# Patient Record
Sex: FEMALE | Race: BLACK OR AFRICAN AMERICAN | NOT HISPANIC OR LATINO
[De-identification: names, ages, dates, MRNs, and addresses within clinical notes are randomized per-mention and may not be internally consistent; named-entity substitution may affect disease eponyms.]

---

## 2017-02-21 ENCOUNTER — FORM ENCOUNTER (OUTPATIENT)
Age: 52
End: 2017-02-21

## 2017-02-24 ENCOUNTER — RESULT REVIEW (OUTPATIENT)
Age: 52
End: 2017-02-24

## 2017-03-02 ENCOUNTER — APPOINTMENT (OUTPATIENT)
Dept: PLASTIC SURGERY | Facility: CLINIC | Age: 52
End: 2017-03-02

## 2017-03-02 DIAGNOSIS — K43.9 VENTRAL HERNIA W/OUT OBSTRUCTION OR GANGRENE: ICD-10-CM

## 2017-03-10 ENCOUNTER — OUTPATIENT (OUTPATIENT)
Dept: OUTPATIENT SERVICES | Facility: HOSPITAL | Age: 52
LOS: 1 days | End: 2017-03-10

## 2017-03-10 DIAGNOSIS — Z90.721 ACQUIRED ABSENCE OF OVARIES, UNILATERAL: Chronic | ICD-10-CM

## 2017-03-10 DIAGNOSIS — Z41.9 ENCOUNTER FOR PROCEDURE FOR PURPOSES OTHER THAN REMEDYING HEALTH STATE, UNSPECIFIED: Chronic | ICD-10-CM

## 2017-03-10 RX ORDER — OXYCODONE AND ACETAMINOPHEN 5; 325 MG/1; MG/1
5-325 TABLET ORAL
Qty: 30 | Refills: 0 | Status: COMPLETED | COMMUNITY
Start: 2017-03-10 | End: 2017-03-31

## 2017-03-13 ENCOUNTER — APPOINTMENT (OUTPATIENT)
Dept: PLASTIC SURGERY | Facility: CLINIC | Age: 52
End: 2017-03-13

## 2017-05-12 ENCOUNTER — OUTPATIENT (OUTPATIENT)
Dept: OUTPATIENT SERVICES | Facility: HOSPITAL | Age: 52
LOS: 1 days | Discharge: HOME | End: 2017-05-12
Payer: COMMERCIAL

## 2017-05-12 ENCOUNTER — MESSAGE (OUTPATIENT)
Age: 52
End: 2017-05-12

## 2017-05-12 ENCOUNTER — RESULT REVIEW (OUTPATIENT)
Age: 52
End: 2017-05-12

## 2017-05-12 DIAGNOSIS — Z88.8 ALLERGY STATUS TO OTHER DRUGS, MEDICAMENTS AND BIOLOGICAL SUBSTANCES STATUS: ICD-10-CM

## 2017-05-12 DIAGNOSIS — N64.89 OTHER SPECIFIED DISORDERS OF BREAST: ICD-10-CM

## 2017-05-12 DIAGNOSIS — Z41.9 ENCOUNTER FOR PROCEDURE FOR PURPOSES OTHER THAN REMEDYING HEALTH STATE, UNSPECIFIED: Chronic | ICD-10-CM

## 2017-05-12 DIAGNOSIS — N65.0 DEFORMITY OF RECONSTRUCTED BREAST: ICD-10-CM

## 2017-05-12 DIAGNOSIS — R11.0 NAUSEA: ICD-10-CM

## 2017-05-12 DIAGNOSIS — Z90.721 ACQUIRED ABSENCE OF OVARIES, UNILATERAL: Chronic | ICD-10-CM

## 2017-05-12 DIAGNOSIS — N60.31 FIBROSCLEROSIS OF RIGHT BREAST: ICD-10-CM

## 2017-05-12 DIAGNOSIS — Z85.3 PERSONAL HISTORY OF MALIGNANT NEOPLASM OF BREAST: ICD-10-CM

## 2017-05-12 PROCEDURE — 19350 NIPPLE/AREOLA RECONSTRUCTION: CPT | Mod: 59,RT

## 2017-05-12 PROCEDURE — 19380 REVJ RECONSTRUCTED BREAST: CPT | Mod: RT

## 2017-05-16 ENCOUNTER — APPOINTMENT (OUTPATIENT)
Dept: PLASTIC SURGERY | Facility: CLINIC | Age: 52
End: 2017-05-16

## 2017-05-22 LAB — SURGICAL PATHOLOGY STUDY: SIGNIFICANT CHANGE UP

## 2017-05-30 ENCOUNTER — APPOINTMENT (OUTPATIENT)
Dept: PLASTIC SURGERY | Facility: CLINIC | Age: 52
End: 2017-05-30

## 2017-08-22 ENCOUNTER — APPOINTMENT (OUTPATIENT)
Dept: BREAST CENTER | Facility: CLINIC | Age: 52
End: 2017-08-22
Payer: COMMERCIAL

## 2017-08-22 VITALS
TEMPERATURE: 96.7 F | WEIGHT: 125 LBS | HEART RATE: 86 BPM | SYSTOLIC BLOOD PRESSURE: 117 MMHG | RESPIRATION RATE: 16 BRPM | BODY MASS INDEX: 23.6 KG/M2 | DIASTOLIC BLOOD PRESSURE: 79 MMHG | HEIGHT: 61 IN

## 2017-08-22 PROCEDURE — 99213 OFFICE O/P EST LOW 20 MIN: CPT

## 2017-09-12 ENCOUNTER — APPOINTMENT (OUTPATIENT)
Dept: BREAST CENTER | Facility: CLINIC | Age: 52
End: 2017-09-12

## 2017-11-16 ENCOUNTER — APPOINTMENT (OUTPATIENT)
Dept: PLASTIC SURGERY | Facility: CLINIC | Age: 52
End: 2017-11-16
Payer: COMMERCIAL

## 2017-11-16 DIAGNOSIS — N65.0 DEFORMITY OF RECONSTRUCTED BREAST: ICD-10-CM

## 2017-11-16 PROCEDURE — 99213 OFFICE O/P EST LOW 20 MIN: CPT

## 2017-12-06 ENCOUNTER — APPOINTMENT (OUTPATIENT)
Dept: BREAST CENTER | Facility: CLINIC | Age: 52
End: 2017-12-06
Payer: COMMERCIAL

## 2017-12-06 VITALS
RESPIRATION RATE: 16 BRPM | BODY MASS INDEX: 23.6 KG/M2 | DIASTOLIC BLOOD PRESSURE: 82 MMHG | WEIGHT: 125 LBS | HEART RATE: 92 BPM | SYSTOLIC BLOOD PRESSURE: 123 MMHG | HEIGHT: 61 IN

## 2017-12-06 PROCEDURE — 99213 OFFICE O/P EST LOW 20 MIN: CPT

## 2018-01-10 ENCOUNTER — FORM ENCOUNTER (OUTPATIENT)
Age: 53
End: 2018-01-10

## 2018-01-11 ENCOUNTER — OUTPATIENT (OUTPATIENT)
Dept: OUTPATIENT SERVICES | Facility: HOSPITAL | Age: 53
LOS: 1 days | End: 2018-01-11
Payer: COMMERCIAL

## 2018-01-11 DIAGNOSIS — Z90.721 ACQUIRED ABSENCE OF OVARIES, UNILATERAL: Chronic | ICD-10-CM

## 2018-01-11 DIAGNOSIS — Z41.9 ENCOUNTER FOR PROCEDURE FOR PURPOSES OTHER THAN REMEDYING HEALTH STATE, UNSPECIFIED: Chronic | ICD-10-CM

## 2018-01-11 PROCEDURE — 76641 ULTRASOUND BREAST COMPLETE: CPT | Mod: 26,LT

## 2018-01-11 PROCEDURE — 77067 SCR MAMMO BI INCL CAD: CPT

## 2018-01-11 PROCEDURE — 77067 SCR MAMMO BI INCL CAD: CPT | Mod: 26,52

## 2018-01-11 PROCEDURE — 76641 ULTRASOUND BREAST COMPLETE: CPT

## 2018-02-14 ENCOUNTER — FORM ENCOUNTER (OUTPATIENT)
Age: 53
End: 2018-02-14

## 2018-02-15 ENCOUNTER — OUTPATIENT (OUTPATIENT)
Dept: OUTPATIENT SERVICES | Facility: HOSPITAL | Age: 53
LOS: 1 days | End: 2018-02-15
Payer: COMMERCIAL

## 2018-02-15 ENCOUNTER — APPOINTMENT (OUTPATIENT)
Dept: MRI IMAGING | Facility: HOSPITAL | Age: 53
End: 2018-02-15

## 2018-02-15 DIAGNOSIS — Z41.9 ENCOUNTER FOR PROCEDURE FOR PURPOSES OTHER THAN REMEDYING HEALTH STATE, UNSPECIFIED: Chronic | ICD-10-CM

## 2018-02-15 DIAGNOSIS — Z90.721 ACQUIRED ABSENCE OF OVARIES, UNILATERAL: Chronic | ICD-10-CM

## 2018-02-15 PROCEDURE — C8906: CPT

## 2018-02-15 PROCEDURE — 77059 MRI BREAST BILATERAL: CPT | Mod: 26

## 2018-02-15 PROCEDURE — 0159T: CPT | Mod: 26

## 2018-02-15 PROCEDURE — A9585: CPT

## 2018-02-15 PROCEDURE — C8937: CPT

## 2018-02-26 ENCOUNTER — FORM ENCOUNTER (OUTPATIENT)
Age: 53
End: 2018-02-26

## 2018-02-27 ENCOUNTER — OUTPATIENT (OUTPATIENT)
Dept: OUTPATIENT SERVICES | Facility: HOSPITAL | Age: 53
LOS: 1 days | End: 2018-02-27
Payer: COMMERCIAL

## 2018-02-27 ENCOUNTER — APPOINTMENT (OUTPATIENT)
Dept: BREAST CENTER | Facility: CLINIC | Age: 53
End: 2018-02-27
Payer: COMMERCIAL

## 2018-02-27 ENCOUNTER — APPOINTMENT (OUTPATIENT)
Dept: ULTRASOUND IMAGING | Facility: HOSPITAL | Age: 53
End: 2018-02-27

## 2018-02-27 VITALS — DIASTOLIC BLOOD PRESSURE: 95 MMHG | SYSTOLIC BLOOD PRESSURE: 137 MMHG | HEART RATE: 98 BPM

## 2018-02-27 DIAGNOSIS — R92.8 OTHER ABNORMAL AND INCONCLUSIVE FINDINGS ON DIAGNOSTIC IMAGING OF BREAST: ICD-10-CM

## 2018-02-27 DIAGNOSIS — Z41.9 ENCOUNTER FOR PROCEDURE FOR PURPOSES OTHER THAN REMEDYING HEALTH STATE, UNSPECIFIED: Chronic | ICD-10-CM

## 2018-02-27 DIAGNOSIS — Z90.721 ACQUIRED ABSENCE OF OVARIES, UNILATERAL: Chronic | ICD-10-CM

## 2018-02-27 PROCEDURE — 76641 ULTRASOUND BREAST COMPLETE: CPT

## 2018-02-27 PROCEDURE — 99213 OFFICE O/P EST LOW 20 MIN: CPT

## 2018-02-27 PROCEDURE — 76642 ULTRASOUND BREAST LIMITED: CPT

## 2018-02-27 PROCEDURE — 76642 ULTRASOUND BREAST LIMITED: CPT | Mod: 26,RT

## 2018-02-27 PROCEDURE — 76641 ULTRASOUND BREAST COMPLETE: CPT | Mod: 26,RT

## 2018-03-01 ENCOUNTER — FORM ENCOUNTER (OUTPATIENT)
Age: 53
End: 2018-03-01

## 2018-03-02 ENCOUNTER — APPOINTMENT (OUTPATIENT)
Dept: ULTRASOUND IMAGING | Facility: HOSPITAL | Age: 53
End: 2018-03-02
Payer: COMMERCIAL

## 2018-03-02 ENCOUNTER — RESULT REVIEW (OUTPATIENT)
Age: 53
End: 2018-03-02

## 2018-03-02 ENCOUNTER — OUTPATIENT (OUTPATIENT)
Dept: OUTPATIENT SERVICES | Facility: HOSPITAL | Age: 53
LOS: 1 days | End: 2018-03-02
Payer: COMMERCIAL

## 2018-03-02 DIAGNOSIS — Z41.9 ENCOUNTER FOR PROCEDURE FOR PURPOSES OTHER THAN REMEDYING HEALTH STATE, UNSPECIFIED: Chronic | ICD-10-CM

## 2018-03-02 DIAGNOSIS — Z90.721 ACQUIRED ABSENCE OF OVARIES, UNILATERAL: Chronic | ICD-10-CM

## 2018-03-02 PROCEDURE — 19083 BX BREAST 1ST LESION US IMAG: CPT

## 2018-03-02 PROCEDURE — A4648: CPT

## 2018-03-02 PROCEDURE — 19083 BX BREAST 1ST LESION US IMAG: CPT | Mod: RT

## 2018-03-02 PROCEDURE — 88305 TISSUE EXAM BY PATHOLOGIST: CPT

## 2018-03-06 ENCOUNTER — OTHER (OUTPATIENT)
Age: 53
End: 2018-03-06

## 2018-03-06 LAB — SURGICAL PATHOLOGY STUDY: SIGNIFICANT CHANGE UP

## 2018-03-23 VITALS
WEIGHT: 125.66 LBS | DIASTOLIC BLOOD PRESSURE: 72 MMHG | RESPIRATION RATE: 20 BRPM | TEMPERATURE: 97 F | HEIGHT: 61 IN | OXYGEN SATURATION: 100 % | HEART RATE: 71 BPM | SYSTOLIC BLOOD PRESSURE: 130 MMHG

## 2018-03-23 NOTE — ASU PATIENT PROFILE, ADULT - PSH
Elective surgical procedure  ankle ligament repair  H/O mastectomy    H/O oophorectomy  partial  H/O ovarian cystectomy Elective surgical procedure  ankle ligament repair  H/O mastectomy  AND RECONSTRUCTION- RIGHT  H/O oophorectomy  partial  H/O ovarian cystectomy

## 2018-03-23 NOTE — ASU PATIENT PROFILE, ADULT - TEACHING/LEARNING LEARNING PREFERENCES
individual instruction individual instruction/skill demonstration/written material/verbal instruction

## 2018-03-25 ENCOUNTER — FORM ENCOUNTER (OUTPATIENT)
Age: 53
End: 2018-03-25

## 2018-03-26 ENCOUNTER — APPOINTMENT (OUTPATIENT)
Dept: ULTRASOUND IMAGING | Facility: HOSPITAL | Age: 53
End: 2018-03-26

## 2018-03-26 ENCOUNTER — APPOINTMENT (OUTPATIENT)
Dept: BREAST CENTER | Facility: HOSPITAL | Age: 53
End: 2018-03-26

## 2018-03-26 ENCOUNTER — OUTPATIENT (OUTPATIENT)
Dept: OUTPATIENT SERVICES | Facility: HOSPITAL | Age: 53
LOS: 1 days | Discharge: HOME | End: 2018-03-26
Payer: COMMERCIAL

## 2018-03-26 ENCOUNTER — RESULT REVIEW (OUTPATIENT)
Age: 53
End: 2018-03-26

## 2018-03-26 VITALS
DIASTOLIC BLOOD PRESSURE: 65 MMHG | SYSTOLIC BLOOD PRESSURE: 118 MMHG | RESPIRATION RATE: 16 BRPM | OXYGEN SATURATION: 100 % | HEART RATE: 64 BPM

## 2018-03-26 DIAGNOSIS — Z98.890 OTHER SPECIFIED POSTPROCEDURAL STATES: Chronic | ICD-10-CM

## 2018-03-26 DIAGNOSIS — Z41.9 ENCOUNTER FOR PROCEDURE FOR PURPOSES OTHER THAN REMEDYING HEALTH STATE, UNSPECIFIED: Chronic | ICD-10-CM

## 2018-03-26 DIAGNOSIS — Z90.721 ACQUIRED ABSENCE OF OVARIES, UNILATERAL: Chronic | ICD-10-CM

## 2018-03-26 PROCEDURE — 19285 PERQ DEV BREAST 1ST US IMAG: CPT

## 2018-03-26 PROCEDURE — 88305 TISSUE EXAM BY PATHOLOGIST: CPT

## 2018-03-26 PROCEDURE — 19125 EXCISION BREAST LESION: CPT | Mod: RT,GC

## 2018-03-26 PROCEDURE — 88307 TISSUE EXAM BY PATHOLOGIST: CPT

## 2018-03-26 PROCEDURE — 77065 DX MAMMO INCL CAD UNI: CPT | Mod: 26,RT

## 2018-03-26 PROCEDURE — 19120 REMOVAL OF BREAST LESION: CPT | Mod: 59,RT,GC

## 2018-03-26 PROCEDURE — 19120 REMOVAL OF BREAST LESION: CPT | Mod: XS,RT

## 2018-03-26 PROCEDURE — 77065 DX MAMMO INCL CAD UNI: CPT

## 2018-03-26 PROCEDURE — 76642 ULTRASOUND BREAST LIMITED: CPT | Mod: 26,RT

## 2018-03-26 PROCEDURE — 19125 EXCISION BREAST LESION: CPT | Mod: RT

## 2018-03-26 PROCEDURE — 19285 PERQ DEV BREAST 1ST US IMAG: CPT | Mod: RT

## 2018-03-26 PROCEDURE — 76642 ULTRASOUND BREAST LIMITED: CPT

## 2018-03-26 PROCEDURE — C1819: CPT

## 2018-03-26 RX ORDER — OXYCODONE AND ACETAMINOPHEN 5; 325 MG/1; MG/1
1 TABLET ORAL EVERY 4 HOURS
Qty: 0 | Refills: 0 | Status: DISCONTINUED | OUTPATIENT
Start: 2018-03-26 | End: 2018-03-26

## 2018-03-26 NOTE — BRIEF OPERATIVE NOTE - OPERATION/FINDINGS
Palpable mass removed from 6 oclock position on scar line. Wide excision of 12 o'clock mass marked with j wire.

## 2018-03-26 NOTE — BRIEF OPERATIVE NOTE - PROCEDURE
<<-----Click on this checkbox to enter Procedure Lumpectomy of right breast  03/26/2018    Active  [id number]  Lumpectomy of breast using j-wire  03/26/2018    Active  [id number]

## 2018-03-26 NOTE — PACU DISCHARGE NOTE - COMMENTS
Pt being discharged to home. Discharge paperwork and instructions given to pt and pt's . Pt iv heplock removed. Safety protocol maintained.

## 2018-03-28 LAB — SURGICAL PATHOLOGY STUDY: SIGNIFICANT CHANGE UP

## 2018-04-04 ENCOUNTER — APPOINTMENT (OUTPATIENT)
Dept: BREAST CENTER | Facility: CLINIC | Age: 53
End: 2018-04-04
Payer: COMMERCIAL

## 2018-04-04 VITALS
DIASTOLIC BLOOD PRESSURE: 81 MMHG | HEIGHT: 61 IN | BODY MASS INDEX: 23.6 KG/M2 | HEART RATE: 95 BPM | WEIGHT: 125 LBS | TEMPERATURE: 97.8 F | SYSTOLIC BLOOD PRESSURE: 129 MMHG

## 2018-04-04 DIAGNOSIS — R92.8 OTHER ABNORMAL AND INCONCLUSIVE FINDINGS ON DIAGNOSTIC IMAGING OF BREAST: ICD-10-CM

## 2018-04-04 PROBLEM — N63.10 UNSPECIFIED LUMP IN THE RIGHT BREAST, UNSPECIFIED QUADRANT: Chronic | Status: ACTIVE | Noted: 2018-03-23

## 2018-04-04 PROCEDURE — 99024 POSTOP FOLLOW-UP VISIT: CPT

## 2018-10-03 ENCOUNTER — APPOINTMENT (OUTPATIENT)
Dept: BREAST CENTER | Facility: CLINIC | Age: 53
End: 2018-10-03

## 2018-10-19 ENCOUNTER — APPOINTMENT (OUTPATIENT)
Dept: BREAST CENTER | Facility: CLINIC | Age: 53
End: 2018-10-19
Payer: COMMERCIAL

## 2018-10-19 PROCEDURE — 99213 OFFICE O/P EST LOW 20 MIN: CPT

## 2018-11-15 ENCOUNTER — APPOINTMENT (OUTPATIENT)
Dept: PLASTIC SURGERY | Facility: CLINIC | Age: 53
End: 2018-11-15
Payer: COMMERCIAL

## 2018-11-15 VITALS — HEIGHT: 61 IN | WEIGHT: 125 LBS | BODY MASS INDEX: 23.6 KG/M2

## 2018-11-15 PROCEDURE — 99213 OFFICE O/P EST LOW 20 MIN: CPT

## 2019-01-31 ENCOUNTER — FORM ENCOUNTER (OUTPATIENT)
Age: 54
End: 2019-01-31

## 2019-02-01 ENCOUNTER — OUTPATIENT (OUTPATIENT)
Dept: OUTPATIENT SERVICES | Facility: HOSPITAL | Age: 54
LOS: 1 days | End: 2019-02-01
Payer: COMMERCIAL

## 2019-02-01 ENCOUNTER — APPOINTMENT (OUTPATIENT)
Dept: MAMMOGRAPHY | Facility: HOSPITAL | Age: 54
End: 2019-02-01
Payer: COMMERCIAL

## 2019-02-01 DIAGNOSIS — Z90.721 ACQUIRED ABSENCE OF OVARIES, UNILATERAL: Chronic | ICD-10-CM

## 2019-02-01 DIAGNOSIS — Z41.9 ENCOUNTER FOR PROCEDURE FOR PURPOSES OTHER THAN REMEDYING HEALTH STATE, UNSPECIFIED: Chronic | ICD-10-CM

## 2019-02-01 DIAGNOSIS — Z98.890 OTHER SPECIFIED POSTPROCEDURAL STATES: Chronic | ICD-10-CM

## 2019-02-01 PROCEDURE — 77067 SCR MAMMO BI INCL CAD: CPT

## 2019-02-01 PROCEDURE — 77067 SCR MAMMO BI INCL CAD: CPT | Mod: 26,52,LT

## 2019-02-01 PROCEDURE — 76641 ULTRASOUND BREAST COMPLETE: CPT | Mod: 26,LT

## 2019-02-01 PROCEDURE — 77063 BREAST TOMOSYNTHESIS BI: CPT | Mod: 26,52

## 2019-02-01 PROCEDURE — 76641 ULTRASOUND BREAST COMPLETE: CPT

## 2019-02-01 PROCEDURE — 77063 BREAST TOMOSYNTHESIS BI: CPT

## 2019-02-14 ENCOUNTER — FORM ENCOUNTER (OUTPATIENT)
Age: 54
End: 2019-02-14

## 2019-02-15 ENCOUNTER — OUTPATIENT (OUTPATIENT)
Dept: OUTPATIENT SERVICES | Facility: HOSPITAL | Age: 54
LOS: 1 days | End: 2019-02-15
Payer: COMMERCIAL

## 2019-02-15 ENCOUNTER — APPOINTMENT (OUTPATIENT)
Dept: MRI IMAGING | Facility: HOSPITAL | Age: 54
End: 2019-02-15
Payer: COMMERCIAL

## 2019-02-15 DIAGNOSIS — Z41.9 ENCOUNTER FOR PROCEDURE FOR PURPOSES OTHER THAN REMEDYING HEALTH STATE, UNSPECIFIED: Chronic | ICD-10-CM

## 2019-02-15 DIAGNOSIS — Z98.890 OTHER SPECIFIED POSTPROCEDURAL STATES: Chronic | ICD-10-CM

## 2019-02-15 DIAGNOSIS — Z90.721 ACQUIRED ABSENCE OF OVARIES, UNILATERAL: Chronic | ICD-10-CM

## 2019-02-15 PROCEDURE — C8937: CPT

## 2019-02-15 PROCEDURE — A9585: CPT

## 2019-02-15 PROCEDURE — 77049 MRI BREAST C-+ W/CAD BI: CPT | Mod: 26

## 2019-02-15 PROCEDURE — 77049 MRI BREAST C-+ W/CAD BI: CPT

## 2019-02-25 ENCOUNTER — CHART COPY (OUTPATIENT)
Age: 54
End: 2019-02-25

## 2019-04-08 NOTE — ASU PATIENT PROFILE, ADULT - CAREGIVER
Glasses Rx given - optional.  Use artificial tears up to 4 times daily both eyes.  (Refresh Tears, Systane Ultra/Balance, or Theratears)  Call in December 2019 for an appointment in April 2020 for Complete Exam.    Dr. Mckeon [phone]    
Yes

## 2019-05-01 ENCOUNTER — RESULT REVIEW (OUTPATIENT)
Age: 54
End: 2019-05-01

## 2019-05-02 ENCOUNTER — APPOINTMENT (OUTPATIENT)
Dept: SURGERY | Facility: CLINIC | Age: 54
End: 2019-05-02
Payer: COMMERCIAL

## 2019-05-02 VITALS — SYSTOLIC BLOOD PRESSURE: 132 MMHG | DIASTOLIC BLOOD PRESSURE: 88 MMHG | HEART RATE: 86 BPM

## 2019-05-02 DIAGNOSIS — Z80.0 FAMILY HISTORY OF MALIGNANT NEOPLASM OF DIGESTIVE ORGANS: ICD-10-CM

## 2019-05-02 PROCEDURE — 99203 OFFICE O/P NEW LOW 30 MIN: CPT

## 2019-05-02 PROCEDURE — 99213 OFFICE O/P EST LOW 20 MIN: CPT

## 2019-05-02 RX ORDER — ONDANSETRON 4 MG/1
4 TABLET ORAL
Qty: 15 | Refills: 0 | Status: DISCONTINUED | COMMUNITY
Start: 2017-05-12 | End: 2019-05-02

## 2019-05-02 NOTE — HISTORY OF PRESENT ILLNESS
[FreeTextEntry1] : Jenny is a 54 yo female, previously under the care of Dr. Bustos/Dr. Shen. 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 In March 2018 she had x2 right excisional breast biopsies; 6:00 excision path revealed breast tissue with stromal fibrosis and hemosiderin-laden macrophages, 2:00 excision path revealed breast tissue with fibrosis and fat necrosis. She is under the care of Dr. Gamez (medical oncologist) and is taking Tamoxifen. \par \par Left breast mammogram/US completed on 2/1/19 which showed heterogeneously dense breast tissue, benign findings without interval change (BI-RADS 2). Her last breast MRI was completed in 2/15/19 which also showed benign findings (BI-RADS 2). \par \par 1 child, 5 pregnancies. LMP 2015.\par Denies family h/o breast or ovarian cancer. Sister diagnosed with liver cancer at age 63.

## 2019-05-02 NOTE — PAST MEDICAL HISTORY
[Postmenopausal] : The patient is postmenopausal [Menopause Age____] : age at menopause was [unfilled] [Definite ___ (Date)] : the last menstrual period was [unfilled] [Total Preg ___] : G[unfilled] [Live Births ___] : P[unfilled]  [Living ___] : Living: [unfilled] [FreeTextEntry7] : NA [FreeTextEntry6] : Herbal medicine for fertility treatment, h/o IVF  [FreeTextEntry8] : 1.5 years

## 2019-06-11 ENCOUNTER — APPOINTMENT (OUTPATIENT)
Dept: PLASTIC SURGERY | Facility: CLINIC | Age: 54
End: 2019-06-11

## 2019-06-24 ENCOUNTER — APPOINTMENT (OUTPATIENT)
Dept: ULTRASOUND IMAGING | Facility: HOSPITAL | Age: 54
End: 2019-06-24
Payer: COMMERCIAL

## 2019-06-24 ENCOUNTER — OUTPATIENT (OUTPATIENT)
Dept: OUTPATIENT SERVICES | Facility: HOSPITAL | Age: 54
LOS: 1 days | End: 2019-06-24
Payer: COMMERCIAL

## 2019-06-24 DIAGNOSIS — Z98.890 OTHER SPECIFIED POSTPROCEDURAL STATES: Chronic | ICD-10-CM

## 2019-06-24 DIAGNOSIS — Z90.721 ACQUIRED ABSENCE OF OVARIES, UNILATERAL: Chronic | ICD-10-CM

## 2019-06-24 DIAGNOSIS — Z41.9 ENCOUNTER FOR PROCEDURE FOR PURPOSES OTHER THAN REMEDYING HEALTH STATE, UNSPECIFIED: Chronic | ICD-10-CM

## 2019-06-24 PROCEDURE — 76830 TRANSVAGINAL US NON-OB: CPT

## 2019-06-24 PROCEDURE — 76830 TRANSVAGINAL US NON-OB: CPT | Mod: 26

## 2019-06-24 PROCEDURE — 76856 US EXAM PELVIC COMPLETE: CPT | Mod: 26

## 2019-06-24 PROCEDURE — 76856 US EXAM PELVIC COMPLETE: CPT

## 2019-06-28 ENCOUNTER — APPOINTMENT (OUTPATIENT)
Dept: ULTRASOUND IMAGING | Facility: HOSPITAL | Age: 54
End: 2019-06-28

## 2019-08-23 ENCOUNTER — OUTPATIENT (OUTPATIENT)
Dept: OUTPATIENT SERVICES | Facility: HOSPITAL | Age: 54
LOS: 1 days | End: 2019-08-23
Payer: COMMERCIAL

## 2019-08-23 ENCOUNTER — APPOINTMENT (OUTPATIENT)
Dept: ULTRASOUND IMAGING | Facility: HOSPITAL | Age: 54
End: 2019-08-23
Payer: COMMERCIAL

## 2019-08-23 DIAGNOSIS — Z90.721 ACQUIRED ABSENCE OF OVARIES, UNILATERAL: Chronic | ICD-10-CM

## 2019-08-23 DIAGNOSIS — Z98.890 OTHER SPECIFIED POSTPROCEDURAL STATES: Chronic | ICD-10-CM

## 2019-08-23 DIAGNOSIS — Z41.9 ENCOUNTER FOR PROCEDURE FOR PURPOSES OTHER THAN REMEDYING HEALTH STATE, UNSPECIFIED: Chronic | ICD-10-CM

## 2019-08-23 PROCEDURE — 76641 ULTRASOUND BREAST COMPLETE: CPT

## 2019-08-23 PROCEDURE — 76641 ULTRASOUND BREAST COMPLETE: CPT | Mod: 26,RT

## 2019-09-12 ENCOUNTER — APPOINTMENT (OUTPATIENT)
Dept: SURGERY | Facility: CLINIC | Age: 54
End: 2019-09-12
Payer: COMMERCIAL

## 2019-09-12 PROCEDURE — 99213 OFFICE O/P EST LOW 20 MIN: CPT

## 2019-09-17 NOTE — PAST MEDICAL HISTORY
[Postmenopausal] : The patient is postmenopausal [Menopause Age____] : age at menopause was [unfilled] [Definite ___ (Date)] : the last menstrual period was [unfilled] [Total Preg ___] : G[unfilled] [Live Births ___] : P[unfilled]  [Living ___] : Living: [unfilled] [FreeTextEntry6] : Herbal medicine for fertility treatment, h/o IVF  [FreeTextEntry7] : NA [FreeTextEntry8] : 1.5 years

## 2019-09-17 NOTE — DATA REVIEWED
[FreeTextEntry1] : Left breast mammogram/US completed on 2/1/19 which showed heterogeneously dense breast tissue, benign findings without interval change (BI-RADS 2). \par \par Her last breast MRI was completed in 2/15/19 which also showed benign findings (BI-RADS 2). \par \par --8/23/19 Right breast US for a right axillary tail mass: unremarkable (BI-RADS 2); recommendation for possible breast MRI pending any clinical concern.

## 2019-09-17 NOTE — HISTORY OF PRESENT ILLNESS
[FreeTextEntry1] : Jenny is a 52 yo female, here for follow up for a  history of multifocal invasive ductal carcinoma (0.6cm) ER/UT positive, HER-2 negative and infiltrating lobular carcinoma (0.8cm) ER positive, UT negative, HER-2 negative with DCIS s/p right mastectomy w/ SLNB and VICENTE in 2015. In March 2018 she had x2 right excisional breast biopsies; 6:00 excision path revealed breast tissue with stromal fibrosis and hemosiderin-laden macrophages, 2:00 excision path revealed breast tissue with fibrosis and fat necrosis. She is under the care of Dr. Gamez (medical oncologist) and is taking Tamoxifen. \par \par She is s/p a right breast US for a right axillary tail mass on 8/23/19, results were unremarkable (BI-RADS 2) with a recommendation for possible breast MRI pending any clinical concern.\par \par She is being evaluated by PT for limited right upper arm ROM. which she states is helping.

## 2020-02-18 ENCOUNTER — APPOINTMENT (OUTPATIENT)
Dept: MRI IMAGING | Facility: HOSPITAL | Age: 55
End: 2020-02-18
Payer: COMMERCIAL

## 2020-02-18 ENCOUNTER — OUTPATIENT (OUTPATIENT)
Dept: OUTPATIENT SERVICES | Facility: HOSPITAL | Age: 55
LOS: 1 days | End: 2020-02-18
Payer: COMMERCIAL

## 2020-02-18 DIAGNOSIS — Z90.721 ACQUIRED ABSENCE OF OVARIES, UNILATERAL: Chronic | ICD-10-CM

## 2020-02-18 DIAGNOSIS — Z98.890 OTHER SPECIFIED POSTPROCEDURAL STATES: Chronic | ICD-10-CM

## 2020-02-18 DIAGNOSIS — Z41.9 ENCOUNTER FOR PROCEDURE FOR PURPOSES OTHER THAN REMEDYING HEALTH STATE, UNSPECIFIED: Chronic | ICD-10-CM

## 2020-02-18 PROCEDURE — A9585: CPT

## 2020-02-18 PROCEDURE — C8908: CPT

## 2020-02-18 PROCEDURE — 77049 MRI BREAST C-+ W/CAD BI: CPT | Mod: 26

## 2020-02-18 PROCEDURE — 77049 MRI BREAST C-+ W/CAD BI: CPT

## 2020-02-18 PROCEDURE — C8937: CPT

## 2020-02-19 DIAGNOSIS — R92.8 OTHER ABNORMAL AND INCONCLUSIVE FINDINGS ON DIAGNOSTIC IMAGING OF BREAST: ICD-10-CM

## 2020-02-20 ENCOUNTER — RESULT REVIEW (OUTPATIENT)
Age: 55
End: 2020-02-20

## 2020-02-20 ENCOUNTER — APPOINTMENT (OUTPATIENT)
Dept: ULTRASOUND IMAGING | Facility: HOSPITAL | Age: 55
End: 2020-02-20
Payer: COMMERCIAL

## 2020-02-20 ENCOUNTER — OUTPATIENT (OUTPATIENT)
Dept: OUTPATIENT SERVICES | Facility: HOSPITAL | Age: 55
LOS: 1 days | End: 2020-02-20
Payer: COMMERCIAL

## 2020-02-20 DIAGNOSIS — Z98.890 OTHER SPECIFIED POSTPROCEDURAL STATES: Chronic | ICD-10-CM

## 2020-02-20 DIAGNOSIS — Z41.9 ENCOUNTER FOR PROCEDURE FOR PURPOSES OTHER THAN REMEDYING HEALTH STATE, UNSPECIFIED: Chronic | ICD-10-CM

## 2020-02-20 DIAGNOSIS — Z90.721 ACQUIRED ABSENCE OF OVARIES, UNILATERAL: Chronic | ICD-10-CM

## 2020-02-20 PROCEDURE — 19083 BX BREAST 1ST LESION US IMAG: CPT

## 2020-02-20 PROCEDURE — 88360 TUMOR IMMUNOHISTOCHEM/MANUAL: CPT

## 2020-02-20 PROCEDURE — 76642 ULTRASOUND BREAST LIMITED: CPT | Mod: 26,RT

## 2020-02-20 PROCEDURE — 88360 TUMOR IMMUNOHISTOCHEM/MANUAL: CPT | Mod: 26

## 2020-02-20 PROCEDURE — A4648: CPT

## 2020-02-20 PROCEDURE — 77065 DX MAMMO INCL CAD UNI: CPT | Mod: 26,RT

## 2020-02-20 PROCEDURE — 88305 TISSUE EXAM BY PATHOLOGIST: CPT | Mod: 26

## 2020-02-20 PROCEDURE — 88305 TISSUE EXAM BY PATHOLOGIST: CPT

## 2020-02-20 PROCEDURE — 76642 ULTRASOUND BREAST LIMITED: CPT

## 2020-02-20 PROCEDURE — 77065 DX MAMMO INCL CAD UNI: CPT

## 2020-02-20 PROCEDURE — 19083 BX BREAST 1ST LESION US IMAG: CPT | Mod: RT

## 2020-02-24 LAB — SURGICAL PATHOLOGY STUDY: SIGNIFICANT CHANGE UP

## 2020-02-26 ENCOUNTER — APPOINTMENT (OUTPATIENT)
Dept: MAMMOGRAPHY | Facility: HOSPITAL | Age: 55
End: 2020-02-26
Payer: COMMERCIAL

## 2020-02-26 ENCOUNTER — OUTPATIENT (OUTPATIENT)
Dept: OUTPATIENT SERVICES | Facility: HOSPITAL | Age: 55
LOS: 1 days | End: 2020-02-26
Payer: COMMERCIAL

## 2020-02-26 ENCOUNTER — APPOINTMENT (OUTPATIENT)
Dept: ULTRASOUND IMAGING | Facility: HOSPITAL | Age: 55
End: 2020-02-26
Payer: COMMERCIAL

## 2020-02-26 DIAGNOSIS — Z98.890 OTHER SPECIFIED POSTPROCEDURAL STATES: Chronic | ICD-10-CM

## 2020-02-26 DIAGNOSIS — Z41.9 ENCOUNTER FOR PROCEDURE FOR PURPOSES OTHER THAN REMEDYING HEALTH STATE, UNSPECIFIED: Chronic | ICD-10-CM

## 2020-02-26 DIAGNOSIS — Z90.721 ACQUIRED ABSENCE OF OVARIES, UNILATERAL: Chronic | ICD-10-CM

## 2020-02-26 PROCEDURE — 76641 ULTRASOUND BREAST COMPLETE: CPT

## 2020-02-26 PROCEDURE — 77063 BREAST TOMOSYNTHESIS BI: CPT | Mod: 26,52

## 2020-02-26 PROCEDURE — 77067 SCR MAMMO BI INCL CAD: CPT | Mod: 26,52,LT

## 2020-02-26 PROCEDURE — 77067 SCR MAMMO BI INCL CAD: CPT

## 2020-02-26 PROCEDURE — 76641 ULTRASOUND BREAST COMPLETE: CPT | Mod: 26,LT

## 2020-02-26 PROCEDURE — 77063 BREAST TOMOSYNTHESIS BI: CPT

## 2020-02-27 ENCOUNTER — APPOINTMENT (OUTPATIENT)
Dept: SURGERY | Facility: CLINIC | Age: 55
End: 2020-02-27
Payer: COMMERCIAL

## 2020-02-27 VITALS
HEART RATE: 88 BPM | BODY MASS INDEX: 23.79 KG/M2 | TEMPERATURE: 98 F | WEIGHT: 126 LBS | DIASTOLIC BLOOD PRESSURE: 84 MMHG | OXYGEN SATURATION: 98 % | SYSTOLIC BLOOD PRESSURE: 124 MMHG | HEIGHT: 61 IN

## 2020-02-27 PROCEDURE — 99213 OFFICE O/P EST LOW 20 MIN: CPT

## 2020-03-02 NOTE — DATA REVIEWED
[FreeTextEntry1] : -- 2/18/2020 ([facility]) MRI breasts: right, reconstructed breast, 0.5cm enhancing suspicious lesion in the UOQ, 4n4cm from the reconstructed nipple, correlation w/ US and US guided biopsy is recommended. Left breast, no suspicious enhancement. BI-RADS 4B. \par \par --2/20/2020 ([facility]) Right breast US: hypoechoic lesion at 10:00 in the reconstructed breast. US guided biopsy is recommended and was performed on the same date. BI-RADS 4B. \par \par --2/20/2020 ([facility]) Right breast 10n4.4 US biopsy: infiltrating ductal carcinoma, well differentiated. IHC is pending. \par

## 2020-03-02 NOTE — HISTORY OF PRESENT ILLNESS
[FreeTextEntry1] : Jenny is a 55 yo female, here for follow up for new diagnosis of right breast 10:00 4cmFN infiltrating ductal carcinoma ER 90%, TN 50%, HER-2 negative found on recent US core biopsy on 2/20/20.\par \par She has a previous history of multifocal invasive ductal carcinoma (0.6cm) ER/TN positive, HER-2 negative and infiltrating lobular carcinoma (0.8cm) ER positive, TN negative, HER-2 negative with DCIS s/p right mastectomy w/ SLNB and VICENTE in 2015. In March 2018 she had x2 right excisional breast biopsies; 6:00 excision path revealed breast tissue with stromal fibrosis and hemosiderin-laden macrophages, 2:00 excision path revealed breast tissue with fibrosis and fat necrosis. She is under the care of Dr. Gamez (medical oncologist) and is taking Tamoxifen. \par \par \par She is being evaluated by PT for limited right upper arm ROM. which she states is helping.

## 2020-03-03 ENCOUNTER — OUTPATIENT (OUTPATIENT)
Dept: OUTPATIENT SERVICES | Facility: HOSPITAL | Age: 55
LOS: 1 days | End: 2020-03-03
Payer: COMMERCIAL

## 2020-03-03 DIAGNOSIS — Z41.9 ENCOUNTER FOR PROCEDURE FOR PURPOSES OTHER THAN REMEDYING HEALTH STATE, UNSPECIFIED: Chronic | ICD-10-CM

## 2020-03-03 DIAGNOSIS — Z98.890 OTHER SPECIFIED POSTPROCEDURAL STATES: Chronic | ICD-10-CM

## 2020-03-03 DIAGNOSIS — Z90.721 ACQUIRED ABSENCE OF OVARIES, UNILATERAL: Chronic | ICD-10-CM

## 2020-03-03 LAB — GLUCOSE BLDC GLUCOMTR-MCNC: 82 MG/DL — SIGNIFICANT CHANGE UP (ref 70–99)

## 2020-03-03 PROCEDURE — 78815 PET IMAGE W/CT SKULL-THIGH: CPT | Mod: 26

## 2020-03-03 PROCEDURE — 78815 PET IMAGE W/CT SKULL-THIGH: CPT

## 2020-03-03 PROCEDURE — 82962 GLUCOSE BLOOD TEST: CPT

## 2020-03-03 PROCEDURE — A9552: CPT

## 2020-03-04 ENCOUNTER — RESULT REVIEW (OUTPATIENT)
Age: 55
End: 2020-03-04

## 2020-03-05 ENCOUNTER — APPOINTMENT (OUTPATIENT)
Dept: SURGERY | Facility: CLINIC | Age: 55
End: 2020-03-05

## 2020-03-13 ENCOUNTER — OUTPATIENT (OUTPATIENT)
Dept: OUTPATIENT SERVICES | Facility: HOSPITAL | Age: 55
LOS: 1 days | End: 2020-03-13
Payer: COMMERCIAL

## 2020-03-13 ENCOUNTER — APPOINTMENT (OUTPATIENT)
Dept: ULTRASOUND IMAGING | Facility: HOSPITAL | Age: 55
End: 2020-03-13
Payer: COMMERCIAL

## 2020-03-13 DIAGNOSIS — Z90.721 ACQUIRED ABSENCE OF OVARIES, UNILATERAL: Chronic | ICD-10-CM

## 2020-03-13 DIAGNOSIS — Z41.9 ENCOUNTER FOR PROCEDURE FOR PURPOSES OTHER THAN REMEDYING HEALTH STATE, UNSPECIFIED: Chronic | ICD-10-CM

## 2020-03-13 DIAGNOSIS — Z98.890 OTHER SPECIFIED POSTPROCEDURAL STATES: Chronic | ICD-10-CM

## 2020-03-13 PROCEDURE — 77065 DX MAMMO INCL CAD UNI: CPT

## 2020-03-13 PROCEDURE — 77065 DX MAMMO INCL CAD UNI: CPT | Mod: 26,RT

## 2020-03-13 PROCEDURE — 19285 PERQ DEV BREAST 1ST US IMAG: CPT

## 2020-03-13 PROCEDURE — 19285 PERQ DEV BREAST 1ST US IMAG: CPT | Mod: RT

## 2020-03-13 PROCEDURE — C1739: CPT

## 2020-03-16 ENCOUNTER — OUTPATIENT (OUTPATIENT)
Dept: OUTPATIENT SERVICES | Facility: HOSPITAL | Age: 55
LOS: 1 days | End: 2020-03-16
Payer: COMMERCIAL

## 2020-03-16 ENCOUNTER — TRANSCRIPTION ENCOUNTER (OUTPATIENT)
Age: 55
End: 2020-03-16

## 2020-03-16 VITALS
HEIGHT: 61 IN | OXYGEN SATURATION: 99 % | HEART RATE: 73 BPM | RESPIRATION RATE: 16 BRPM | DIASTOLIC BLOOD PRESSURE: 75 MMHG | WEIGHT: 130.51 LBS | SYSTOLIC BLOOD PRESSURE: 123 MMHG

## 2020-03-16 DIAGNOSIS — Z41.9 ENCOUNTER FOR PROCEDURE FOR PURPOSES OTHER THAN REMEDYING HEALTH STATE, UNSPECIFIED: Chronic | ICD-10-CM

## 2020-03-16 DIAGNOSIS — Z98.890 OTHER SPECIFIED POSTPROCEDURAL STATES: Chronic | ICD-10-CM

## 2020-03-16 DIAGNOSIS — Z90.721 ACQUIRED ABSENCE OF OVARIES, UNILATERAL: Chronic | ICD-10-CM

## 2020-03-16 PROCEDURE — 78195 LYMPH SYSTEM IMAGING: CPT

## 2020-03-16 PROCEDURE — 78195 LYMPH SYSTEM IMAGING: CPT | Mod: 26

## 2020-03-16 PROCEDURE — A9541: CPT

## 2020-03-16 NOTE — ASU PATIENT PROFILE, ADULT - PSH
Elective surgical procedure  ankle ligament repair  H/O mastectomy  AND RECONSTRUCTION- RIGHT  H/O oophorectomy  partial  H/O ovarian cystectomy Elective surgical procedure  ankle ligament repair  H/O mastectomy  AND RECONSTRUCTION- RIGHT  H/O oophorectomy  partial  H/O ovarian cystectomy    S/P dilatation and curettage  03/04/2020

## 2020-03-16 NOTE — ASU PATIENT PROFILE, ADULT - IS PATIENT PREGNANT?
pt reports twisting L ankle walking down the stairs yesterday, skipping one stair. DId not fall down the stairs. reports pain and swelling to lateral ankle no

## 2020-03-17 ENCOUNTER — RESULT REVIEW (OUTPATIENT)
Age: 55
End: 2020-03-17

## 2020-03-17 ENCOUNTER — APPOINTMENT (OUTPATIENT)
Dept: SURGERY | Facility: CLINIC | Age: 55
End: 2020-03-17
Payer: COMMERCIAL

## 2020-03-17 ENCOUNTER — OUTPATIENT (OUTPATIENT)
Dept: OUTPATIENT SERVICES | Facility: HOSPITAL | Age: 55
LOS: 1 days | Discharge: HOME | End: 2020-03-17
Payer: COMMERCIAL

## 2020-03-17 VITALS
RESPIRATION RATE: 14 BRPM | HEART RATE: 70 BPM | DIASTOLIC BLOOD PRESSURE: 86 MMHG | OXYGEN SATURATION: 97 % | SYSTOLIC BLOOD PRESSURE: 138 MMHG

## 2020-03-17 DIAGNOSIS — Z41.9 ENCOUNTER FOR PROCEDURE FOR PURPOSES OTHER THAN REMEDYING HEALTH STATE, UNSPECIFIED: Chronic | ICD-10-CM

## 2020-03-17 DIAGNOSIS — Z98.890 OTHER SPECIFIED POSTPROCEDURAL STATES: Chronic | ICD-10-CM

## 2020-03-17 DIAGNOSIS — Z90.721 ACQUIRED ABSENCE OF OVARIES, UNILATERAL: Chronic | ICD-10-CM

## 2020-03-17 LAB
APPEARANCE UR: ABNORMAL
BACTERIA # UR AUTO: PRESENT /HPF
BILIRUB UR-MCNC: NEGATIVE — SIGNIFICANT CHANGE UP
COLOR SPEC: YELLOW — SIGNIFICANT CHANGE UP
COMMENT - URINE: SIGNIFICANT CHANGE UP
DIFF PNL FLD: ABNORMAL
EPI CELLS # UR: ABNORMAL /HPF (ref 0–5)
GLUCOSE UR QL: NEGATIVE — SIGNIFICANT CHANGE UP
KETONES UR-MCNC: NEGATIVE — SIGNIFICANT CHANGE UP
LEUKOCYTE ESTERASE UR-ACNC: ABNORMAL
NITRITE UR-MCNC: NEGATIVE — SIGNIFICANT CHANGE UP
PH UR: 7 — SIGNIFICANT CHANGE UP (ref 5–8)
PROT UR-MCNC: NEGATIVE MG/DL — SIGNIFICANT CHANGE UP
RBC CASTS # UR COMP ASSIST: < 5 /HPF — SIGNIFICANT CHANGE UP
SP GR SPEC: 1.01 — SIGNIFICANT CHANGE UP (ref 1–1.03)
UROBILINOGEN FLD QL: 0.2 E.U./DL — SIGNIFICANT CHANGE UP
WBC UR QL: > 10 /HPF

## 2020-03-17 PROCEDURE — 76098 X-RAY EXAM SURGICAL SPECIMEN: CPT | Mod: 26

## 2020-03-17 PROCEDURE — 88307 TISSUE EXAM BY PATHOLOGIST: CPT | Mod: 26

## 2020-03-17 PROCEDURE — 88305 TISSUE EXAM BY PATHOLOGIST: CPT

## 2020-03-17 PROCEDURE — 19301 PARTIAL MASTECTOMY: CPT

## 2020-03-17 PROCEDURE — 88307 TISSUE EXAM BY PATHOLOGIST: CPT

## 2020-03-17 PROCEDURE — 38525 BIOPSY/REMOVAL LYMPH NODES: CPT

## 2020-03-17 PROCEDURE — 19301 PARTIAL MASTECTOMY: CPT | Mod: 58,RT

## 2020-03-17 PROCEDURE — 88305 TISSUE EXAM BY PATHOLOGIST: CPT | Mod: 26

## 2020-03-17 PROCEDURE — 76098 X-RAY EXAM SURGICAL SPECIMEN: CPT

## 2020-03-17 PROCEDURE — 81001 URINALYSIS AUTO W/SCOPE: CPT

## 2020-03-17 RX ORDER — HYDROMORPHONE HYDROCHLORIDE 2 MG/ML
0.5 INJECTION INTRAMUSCULAR; INTRAVENOUS; SUBCUTANEOUS
Refills: 0 | Status: DISCONTINUED | OUTPATIENT
Start: 2020-03-17 | End: 2020-03-17

## 2020-03-17 RX ORDER — ONDANSETRON 8 MG/1
4 TABLET, FILM COATED ORAL
Refills: 0 | Status: DISCONTINUED | OUTPATIENT
Start: 2020-03-17 | End: 2020-03-17

## 2020-03-17 NOTE — PACU DISCHARGE NOTE - COMMENTS
Discharge patient in stable condition, fully recovered and meets PACU Discharge Criteria. No complaints of pain. Nausea or vomiting not noted. Surgical site clean, dry and intact. IV Access discontinued as ordered. Discharge instructions provided to patient. Patient able to verbalize understanding of instructions. Able to ambulate independently and pass void trial. Accompanied by PCA to lobby with /escort. Safety measures maintained.

## 2020-03-17 NOTE — BRIEF OPERATIVE NOTE - OPERATION/FINDINGS
The breast mass was outlined using a saviscout. Incisions were made in the axilla with minimal dissection and subsequent exploration for sentinal lymph node. The remaining cavity was temporarily packed. Methylene blue was injected in the left upper quadrant. Excisions were then made along the marked breast mass. The mass was excised with margins. XRay confirmed the clip was within the excised mass. The remaining cavity was explored, any bleeding was cauterized. It was then reconstructed to avoid deformities and closed with 3-0 prolene and 4-0 monocryl. The axillary cavity was then explored, minimal methylene blue was noted and no lymph nodes were found. The remaining cavity was then explored and closed in the same fashion.

## 2020-03-17 NOTE — BRIEF OPERATIVE NOTE - NSICDXBRIEFPROCEDURE_GEN_ALL_CORE_FT
PROCEDURES:  Surgical removal of mass of anterior chest wall with reconstruction 17-Mar-2020 12:12:52 Excision of recurrent breast cancer following deep flap reconstruction w/ sentinal lymph node exploration Miguel Mariee

## 2020-03-20 LAB — SURGICAL PATHOLOGY STUDY: SIGNIFICANT CHANGE UP

## 2020-03-26 ENCOUNTER — APPOINTMENT (OUTPATIENT)
Dept: SURGERY | Facility: CLINIC | Age: 55
End: 2020-03-26
Payer: COMMERCIAL

## 2020-03-26 PROCEDURE — 99024 POSTOP FOLLOW-UP VISIT: CPT

## 2020-03-26 NOTE — HISTORY OF PRESENT ILLNESS
[FreeTextEntry1] : Jenny is a 55 yo female, here for post-op s/p right excision of recurrent breast cancer on 3/17/20 for new diagnosis of right breast 10:00 4cmFN infiltrating ductal carcinoma ER 90%, ID 50%, HER-2 negative. Final surgical pathology revealed infiltrating ductal carcinoma, recurrent, no tumor seen on inked margins. She is doing well, c/o slight right axillary pain. \par \par She has a previous history of multifocal invasive ductal carcinoma (0.6cm) ER/ID positive, HER-2 negative and infiltrating lobular carcinoma (0.8cm) ER positive, ID negative, HER-2 negative with DCIS s/p right mastectomy w/ SLNB and VICENTE in 2015. She is under the care of Dr. Gamez (medical oncologist) and is taking Tamoxifen. She followed up with Dr. Gamez today 3/26/20. \par \par \par She is being evaluated by PT for limited right upper arm ROM. which she states is helping.

## 2020-03-26 NOTE — DATA REVIEWED
[FreeTextEntry1] : -- 2/18/2020 ([facility]) MRI breasts: right, reconstructed breast, 0.5cm enhancing suspicious lesion in the UOQ, 4n4cm from the reconstructed nipple, correlation w/ US and US guided biopsy is recommended. Left breast, no suspicious enhancement. BI-RADS 4B. \par \par --2/20/2020 ([facility]) Right breast US: hypoechoic lesion at 10:00 in the reconstructed breast. US guided biopsy is recommended and was performed on the same date. BI-RADS 4B. \par \par --2/20/2020 ([facility]) Right breast 10n4.4 US biopsy: infiltrating ductal carcinoma, well differentiated. IHC is pending. \par \par --3/17/2020 ([facility]) s/p right chest wall excision, final surgical pathology revealed infiltrating ductal carcinoma, recurrent, no tumor seen on inked margins, subcutaneous tissue and extremely scanty mammary tissue present, biopsy site changes. Anterior right breast margin, negative for tumor, skin without significant pathologic features, lateral right breast margin, negative for tumor, adipose tissue, unremarkable. Inferior margin nergative for tumor, medial margin, negative for tumor, superior margin, negative for tumor.

## 2020-03-27 ENCOUNTER — APPOINTMENT (OUTPATIENT)
Dept: RADIATION ONCOLOGY | Facility: CLINIC | Age: 55
End: 2020-03-27
Payer: COMMERCIAL

## 2020-03-27 PROCEDURE — 99204 OFFICE O/P NEW MOD 45 MIN: CPT | Mod: 25

## 2020-03-27 NOTE — VITALS
[100: Normal, no complaints, no evidence of disease.] : 100: Normal, no complaints, no evidence of disease.

## 2020-03-27 NOTE — PHYSICAL EXAM
[Normal] : the ears, nose and oropharynx were normal in appearance [de-identified] : No visible cervical or supraclavicular nodes.   [de-identified] : The right reconstructed breast has two healing incisions, one at the recurrence wit at 10 o'clock and a second in the lower axilla.   [de-identified] : No suspicious findings at present.

## 2020-03-27 NOTE — HISTORY OF PRESENT ILLNESS
[FreeTextEntry1] : Ms. Jenny Matos is a 54 year-old female referred by Dr. Ivan for consideration of radiation therapy for recurrent right infiltrating ductal carcinoma ER/OK positive, HER-2 negative s/p right mastectomy with SLNB and VICENTE in 2015 and right chest wall excision on 3/17/20.  She has a history of multifocal pT1bN0 invasive ductal carcinoma (0.6 cm) ER/OK positive, HER-2 negative and pT1bN0 infiltrating lobular carcinoma (0.8 cm) ER positive, OK negative, HER-2 negative with DCIS s/p right mastectomy with SLNB and VICENTE by Dr. Bustos and Dr. Soria in 2015.  She underwent right breast skin biopsy in May 2017; pathology showed benign breast tissue with dermal fibrosis and foreign body type giant cell reaction.  She is on Tamoxifen under the care of Dr. Gamez and expects to switch to anastrozole.   \par \par MRI of the breasts done 2/18/2020 demonstrated a new 0.5 cm enhancing lesion in the upper outer quadrant of the reconstructed right breast (BI-RADS 4B).  US guided biopsy was recommended.  She underwent targeted right breast ultrasound on 2/20/20, which showed a hypoechoic lesion at the 10:00 position of the reconstructed right breast, which likely corresponds to the enhancing lesion on MRI, and US guided biopsy of the lesion at 10:00, 4.4 cm from the nipple.  Pathology confirmed well-differentiated infiltrating ductal carcinoma, ER/OK positive, HER-2 negative.  \par \par She underwent right chest wall excision by Dr. Ivan on 3/17/20.  Pathology as follows:\par Final Diagnosis\par 1. Anterior margin, right breast, excision:\par - Negative for tumor; skin without significant pathologic\par features\par 2. Right chest wall cancer, excision:\par - Infiltrating ductal carcinoma, recurrent\par - No tumor seen on inked margins\par - Subcutaneous tissue and extremely scanty mammary tissue\par present\par - Biopsy site changes\par 3. Lateral margin, right breast, excision:\par - Negative for tumor; adipose tissue, unremarkable\par 4. Inferior margin, right breast, excision:\par - Negative for tumor; adipose tissue with focal histiocytic\par proliferation\par 5. Medial margin, right breast, excision:\par - Negative for tumor; subcutaneous tissue without\par significant pathological features\par 6. Superior margin, right breast, excision:\par - Negative for tumor; subcutaneous tissue without\par significant pathologic features\par Addendum\par Invasive tumor spans 4mm in this material (greatest contiguous\par microscopic measurement).\par \par A repeat sentinel node biopsy was attempted but did not localize tracer.  \par \par She is at home with her  as she undergoes a consult via telemedicine.\par

## 2020-03-27 NOTE — DISEASE MANAGEMENT
[Pathological] : TNM Stage: p [FreeTextEntry4] : Recurrent breast cancer [TTNM] : . [NTNM] : . [MTNM] : . [IV] : IV [de-identified] : Right chest wall

## 2020-05-06 NOTE — HISTORY OF PRESENT ILLNESS
[FreeTextEntry1] : 5/6/20 - OTV - Ms. Matos has completed 265 cGy / 4240 cGy to the right chest wall.  Today, she reports feeling well.  She notes mild  burning pain at her incision site.  Otherwise without complaints.  She started Anastrazole.  Has questions about using biofreeze on her shoulders and medications. \par __________________________________________________\par ONCOLOGIC HISTORY (3/27/20)\par Ms. Jenny Matos is a 54 year-old female referred by Dr. Ivan for consideration of radiation therapy for recurrent right infiltrating ductal carcinoma ER/MS positive, HER-2 negative s/p right mastectomy with SLNB and VICENTE in 2015 and right chest wall excision on 3/17/20.  She has a history of multifocal pT1bN0 invasive ductal carcinoma (0.6 cm) ER/MS positive, HER-2 negative and pT1bN0 infiltrating lobular carcinoma (0.8 cm) ER positive, MS negative, HER-2 negative with DCIS s/p right mastectomy with SLNB and VICENTE by Dr. Bustos and Dr. Soria in 2015.  She underwent right breast skin biopsy in May 2017; pathology showed benign breast tissue with dermal fibrosis and foreign body type giant cell reaction.  She is on Tamoxifen under the care of Dr. Gamez and expects to switch to anastrozole.   \par \par MRI of the breasts done 2/18/2020 demonstrated a new 0.5 cm enhancing lesion in the upper outer quadrant of the reconstructed right breast (BI-RADS 4B).  US guided biopsy was recommended.  She underwent targeted right breast ultrasound on 2/20/20, which showed a hypoechoic lesion at the 10:00 position of the reconstructed right breast, which likely corresponds to the enhancing lesion on MRI, and US guided biopsy of the lesion at 10:00, 4.4 cm from the nipple.  Pathology confirmed well-differentiated infiltrating ductal carcinoma, ER/MS positive, HER-2 negative.  \par \par She underwent right chest wall excision by Dr. Ivan on 3/17/20.  Pathology as follows:\par Final Diagnosis\par 1. Anterior margin, right breast, excision:\par - Negative for tumor; skin without significant pathologic\par features\par 2. Right chest wall cancer, excision:\par - Infiltrating ductal carcinoma, recurrent\par - No tumor seen on inked margins\par - Subcutaneous tissue and extremely scanty mammary tissue\par present\par - Biopsy site changes\par 3. Lateral margin, right breast, excision:\par - Negative for tumor; adipose tissue, unremarkable\par 4. Inferior margin, right breast, excision:\par - Negative for tumor; adipose tissue with focal histiocytic\par proliferation\par 5. Medial margin, right breast, excision:\par - Negative for tumor; subcutaneous tissue without\par significant pathological features\par 6. Superior margin, right breast, excision:\par - Negative for tumor; subcutaneous tissue without\par significant pathologic features\par Addendum\par Invasive tumor spans 4mm in this material (greatest contiguous\par microscopic measurement).\par \par A repeat sentinel node biopsy was attempted but did not localize tracer.  \par \par She is at home with her  as she undergoes a consult via telemedicine.\par

## 2020-05-06 NOTE — PHYSICAL EXAM
[Outer Ear] : the ears and nose were normal in appearance [Hearing Threshold Finger Rub Not [place]] : hearing was normal [] : no respiratory distress [Respiration, Rhythm And Depth] : normal respiratory rhythm and effort [Normal] : oriented to person, place and time, the affect was normal, the mood was normal and not anxious [de-identified] : The right reconstructed breast has two healing incisions, one at the recurrence site at 10 o'clock and a second in the lower axilla.

## 2020-05-06 NOTE — DISEASE MANAGEMENT
[Pathological] : TNM Stage: p [IV] : IV [FreeTextEntry4] : Recurrent breast cancer [TTNM] : . [NTNM] : . [MTNM] : . [de-identified] : 265 cGy [de-identified] : 3451 cGy [de-identified] : Right chest wall

## 2020-05-13 VITALS
DIASTOLIC BLOOD PRESSURE: 86 MMHG | RESPIRATION RATE: 15 BRPM | HEART RATE: 93 BPM | TEMPERATURE: 98.1 F | SYSTOLIC BLOOD PRESSURE: 128 MMHG | OXYGEN SATURATION: 98 %

## 2020-05-13 NOTE — HISTORY OF PRESENT ILLNESS
[FreeTextEntry1] : 5/13/20 - OTV - Ms. Matos has completed 1590 cGy / 4240 cGy to the right chest wall.  She reports feeling very tired after radiation yesterday with myalgias.  She denies fevers, chills, cough, rhinorrhea, dyspnea.  She is feeling better today.  She denies skin irritation.  She is using Aquaphor and aloe vera from the plant.  \par \par 5/6/20 - OTV - Ms. Matos has completed 265 cGy / 4240 cGy to the right chest wall.  Today, she reports feeling well.  She notes mild  burning pain at her incision site.  Otherwise without complaints.  She started Anastrazole.  Has questions about using biofreeze on her shoulders and medications. \par __________________________________________________\par ONCOLOGIC HISTORY (3/27/20)\par Ms. Jenny Matos is a 54 year-old female referred by Dr. Ivan for consideration of radiation therapy for recurrent right infiltrating ductal carcinoma ER/TX positive, HER-2 negative s/p right mastectomy with SLNB and VICENTE in 2015 and right chest wall excision on 3/17/20.  She has a history of multifocal pT1bN0 invasive ductal carcinoma (0.6 cm) ER/TX positive, HER-2 negative and pT1bN0 infiltrating lobular carcinoma (0.8 cm) ER positive, TX negative, HER-2 negative with DCIS s/p right mastectomy with SLNB and VICENTE by Dr. Bustos and Dr. Sroia in 2015.  She underwent right breast skin biopsy in May 2017; pathology showed benign breast tissue with dermal fibrosis and foreign body type giant cell reaction.  She is on Tamoxifen under the care of Dr. Gamez and expects to switch to anastrozole.   \par \par MRI of the breasts done 2/18/2020 demonstrated a new 0.5 cm enhancing lesion in the upper outer quadrant of the reconstructed right breast (BI-RADS 4B).  US guided biopsy was recommended.  She underwent targeted right breast ultrasound on 2/20/20, which showed a hypoechoic lesion at the 10:00 position of the reconstructed right breast, which likely corresponds to the enhancing lesion on MRI, and US guided biopsy of the lesion at 10:00, 4.4 cm from the nipple.  Pathology confirmed well-differentiated infiltrating ductal carcinoma, ER/TX positive, HER-2 negative.  \par \par She underwent right chest wall excision by Dr. Ivan on 3/17/20.  Pathology as follows:\par Final Diagnosis\par 1. Anterior margin, right breast, excision:\par - Negative for tumor; skin without significant pathologic\par features\par 2. Right chest wall cancer, excision:\par - Infiltrating ductal carcinoma, recurrent\par - No tumor seen on inked margins\par - Subcutaneous tissue and extremely scanty mammary tissue\par present\par - Biopsy site changes\par 3. Lateral margin, right breast, excision:\par - Negative for tumor; adipose tissue, unremarkable\par 4. Inferior margin, right breast, excision:\par - Negative for tumor; adipose tissue with focal histiocytic\par proliferation\par 5. Medial margin, right breast, excision:\par - Negative for tumor; subcutaneous tissue without\par significant pathological features\par 6. Superior margin, right breast, excision:\par - Negative for tumor; subcutaneous tissue without\par significant pathologic features\par Addendum\par Invasive tumor spans 4mm in this material (greatest contiguous\par microscopic measurement).\par \par A repeat sentinel node biopsy was attempted but did not localize tracer.  \par \par She is at home with her  as she undergoes a consult via telemedicine.\par

## 2020-05-13 NOTE — PHYSICAL EXAM
[Outer Ear] : the ears and nose were normal in appearance [Hearing Threshold Finger Rub Not [place]] : hearing was normal [Respiration, Rhythm And Depth] : normal respiratory rhythm and effort [] : no respiratory distress [Normal] : normal skin color and pigmentation and no rash [de-identified] : There is a very faint erythema developing in the field.   On the whole there are no suspicious findings.

## 2020-05-13 NOTE — DISEASE MANAGEMENT
[Pathological] : TNM Stage: p [IV] : IV [FreeTextEntry4] : Recurrent breast cancer [NTNM] : . [TTNM] : . [MTNM] : . [de-identified] : 2136 cGy [de-identified] : 1590 cGy [de-identified] : Right chest wall

## 2020-05-20 NOTE — PHYSICAL EXAM
[Outer Ear] : the ears and nose were normal in appearance [Hearing Threshold Finger Rub Not [place]] : hearing was normal [] : no respiratory distress [Respiration, Rhythm And Depth] : normal respiratory rhythm and effort [Normal] : oriented to person, place and time, the affect was normal, the mood was normal and not anxious [de-identified] : There is a very faint erythema developing in the field.   On the whole there are no suspicious findings.

## 2020-05-20 NOTE — HISTORY OF PRESENT ILLNESS
[FreeTextEntry1] : 5/20/20 - OTV - Ms. Matos has completed 2915 cGy / 4240 cGy to the right chest wall.\par \par 5/13/20 - OTV - Ms. Matos has completed 1590 cGy / 4240 cGy to the right chest wall.  She reports feeling very tired after radiation yesterday with myalgias.  She denies fevers, chills, cough, rhinorrhea, dyspnea.  She is feeling better today.  She denies skin irritation.  She is using Aquaphor and aloe vera from the plant.  \par \par 5/6/20 - OTV - Ms. Matos has completed 265 cGy / 4240 cGy to the right chest wall.  Today, she reports feeling well.  She notes mild  burning pain at her incision site.  Otherwise without complaints.  She started Anastrazole.  Has questions about using biofreeze on her shoulders and medications. \par __________________________________________________\par ONCOLOGIC HISTORY (3/27/20)\par Ms. Jenny Matos is a 54 year-old female referred by Dr. Ivan for consideration of radiation therapy for recurrent right infiltrating ductal carcinoma ER/TX positive, HER-2 negative s/p right mastectomy with SLNB and VICENTE in 2015 and right chest wall excision on 3/17/20.  She has a history of multifocal pT1bN0 invasive ductal carcinoma (0.6 cm) ER/TX positive, HER-2 negative and pT1bN0 infiltrating lobular carcinoma (0.8 cm) ER positive, TX negative, HER-2 negative with DCIS s/p right mastectomy with SLNB and VICENTE by Dr. Bustos and Dr. Soria in 2015.  She underwent right breast skin biopsy in May 2017; pathology showed benign breast tissue with dermal fibrosis and foreign body type giant cell reaction.  She is on Tamoxifen under the care of Dr. Gamez and expects to switch to anastrozole.   \par \par MRI of the breasts done 2/18/2020 demonstrated a new 0.5 cm enhancing lesion in the upper outer quadrant of the reconstructed right breast (BI-RADS 4B).  US guided biopsy was recommended.  She underwent targeted right breast ultrasound on 2/20/20, which showed a hypoechoic lesion at the 10:00 position of the reconstructed right breast, which likely corresponds to the enhancing lesion on MRI, and US guided biopsy of the lesion at 10:00, 4.4 cm from the nipple.  Pathology confirmed well-differentiated infiltrating ductal carcinoma, ER/TX positive, HER-2 negative.  \par \par She underwent right chest wall excision by Dr. Ivan on 3/17/20.  Pathology as follows:\par Final Diagnosis\par 1. Anterior margin, right breast, excision:\par - Negative for tumor; skin without significant pathologic\par features\par 2. Right chest wall cancer, excision:\par - Infiltrating ductal carcinoma, recurrent\par - No tumor seen on inked margins\par - Subcutaneous tissue and extremely scanty mammary tissue\par present\par - Biopsy site changes\par 3. Lateral margin, right breast, excision:\par - Negative for tumor; adipose tissue, unremarkable\par 4. Inferior margin, right breast, excision:\par - Negative for tumor; adipose tissue with focal histiocytic\par proliferation\par 5. Medial margin, right breast, excision:\par - Negative for tumor; subcutaneous tissue without\par significant pathological features\par 6. Superior margin, right breast, excision:\par - Negative for tumor; subcutaneous tissue without\par significant pathologic features\par Addendum\par Invasive tumor spans 4mm in this material (greatest contiguous\par microscopic measurement).\par \par A repeat sentinel node biopsy was attempted but did not localize tracer.  \par \par She is at home with her  as she undergoes a consult via telemedicine.\par

## 2020-05-20 NOTE — DISEASE MANAGEMENT
[Pathological] : TNM Stage: p [FreeTextEntry4] : Recurrent breast cancer [TTNM] : . [MTNM] : . [NTNM] : . [IV] : IV [de-identified] : 0625 cGy [de-identified] : 2300 cGy [de-identified] : Right chest wall

## 2020-05-27 NOTE — VITALS
[90: Able to carry normal activity; minor signs or symptoms of disease.] : 90: Able to carry normal activity; minor signs or symptoms of disease.  [Maximal Pain Intensity: 0/10] : 0/10 [Least Pain Intensity: 0/10] : 0/10 [NoTreatment Scheduled] : no treatment scheduled

## 2020-05-27 NOTE — DISEASE MANAGEMENT
[Pathological] : TNM Stage: p [IV] : IV [FreeTextEntry4] : Recurrent breast cancer [TTNM] : . [NTNM] : . [MTNM] : . [de-identified] : 3975 cGy [de-identified] : 8845 cGy [de-identified] : Right chest wall

## 2020-05-27 NOTE — PHYSICAL EXAM
[Outer Ear] : the ears and nose were normal in appearance [] : no respiratory distress [Hearing Threshold Finger Rub Not [place]] : hearing was normal [Auscultation Breath Sounds / Voice Sounds] : lungs were clear to auscultation bilaterally [Respiration, Rhythm And Depth] : normal respiratory rhythm and effort [Heart Rate And Rhythm] : heart rate and rhythm were normal [Heart Sounds] : normal S1 and S2 [Normal] : oriented to person, place and time, the affect was normal, the mood was normal and not anxious [de-identified] : There is a very faint erythema developing in the field.   On the whole there are no suspicious findings.  [de-identified] : faint erythema right chest wall

## 2020-05-27 NOTE — DISEASE MANAGEMENT
[Pathological] : TNM Stage: p [IV] : IV [FreeTextEntry4] : Recurrent breast cancer [TTNM] : . [NTNM] : . [MTNM] : . [de-identified] : 9915 cGy [de-identified] : 7737 cGy [de-identified] : Right chest wall

## 2020-05-27 NOTE — HISTORY OF PRESENT ILLNESS
[FreeTextEntry1] : 5/20/20 - OTV - Ms. Matos has completed 2915 cGy / 4240 cGy to the right chest wall.  Today, she reports feeling well.  She is without complaints.  She is using Aquaphor.  Worries that she is not washing it off well enough. \par \par 5/13/20 - OTV - Ms. Matos has completed 1590 cGy / 4240 cGy to the right chest wall.  She reports feeling very tired after radiation yesterday with myalgias.  She denies fevers, chills, cough, rhinorrhea, dyspnea.  She is feeling better today.  She denies skin irritation.  She is using Aquaphor and aloe vera from the plant.  \par \par 5/6/20 - OTV - Ms. Matos has completed 265 cGy / 4240 cGy to the right chest wall.  Today, she reports feeling well.  She notes mild  burning pain at her incision site.  Otherwise without complaints.  She started Anastrazole.  Has questions about using biofreeze on her shoulders and medications. \par __________________________________________________\par ONCOLOGIC HISTORY (3/27/20)\par Ms. Jenny Matos is a 54 year-old female referred by Dr. Ivan for consideration of radiation therapy for recurrent right infiltrating ductal carcinoma ER/HI positive, HER-2 negative s/p right mastectomy with SLNB and VICENTE in 2015 and right chest wall excision on 3/17/20.  She has a history of multifocal pT1bN0 invasive ductal carcinoma (0.6 cm) ER/HI positive, HER-2 negative and pT1bN0 infiltrating lobular carcinoma (0.8 cm) ER positive, HI negative, HER-2 negative with DCIS s/p right mastectomy with SLNB and VICENTE by Dr. Bustos and Dr. Soria in 2015.  She underwent right breast skin biopsy in May 2017; pathology showed benign breast tissue with dermal fibrosis and foreign body type giant cell reaction.  She is on Tamoxifen under the care of Dr. Gamez and expects to switch to anastrozole.   \par \par MRI of the breasts done 2/18/2020 demonstrated a new 0.5 cm enhancing lesion in the upper outer quadrant of the reconstructed right breast (BI-RADS 4B).  US guided biopsy was recommended.  She underwent targeted right breast ultrasound on 2/20/20, which showed a hypoechoic lesion at the 10:00 position of the reconstructed right breast, which likely corresponds to the enhancing lesion on MRI, and US guided biopsy of the lesion at 10:00, 4.4 cm from the nipple.  Pathology confirmed well-differentiated infiltrating ductal carcinoma, ER/HI positive, HER-2 negative.  \par \par She underwent right chest wall excision by Dr. Ivan on 3/17/20.  Pathology as follows:\par Final Diagnosis\par 1. Anterior margin, right breast, excision:\par - Negative for tumor; skin without significant pathologic\par features\par 2. Right chest wall cancer, excision:\par - Infiltrating ductal carcinoma, recurrent\par - No tumor seen on inked margins\par - Subcutaneous tissue and extremely scanty mammary tissue\par present\par - Biopsy site changes\par 3. Lateral margin, right breast, excision:\par - Negative for tumor; adipose tissue, unremarkable\par 4. Inferior margin, right breast, excision:\par - Negative for tumor; adipose tissue with focal histiocytic\par proliferation\par 5. Medial margin, right breast, excision:\par - Negative for tumor; subcutaneous tissue without\par significant pathological features\par 6. Superior margin, right breast, excision:\par - Negative for tumor; subcutaneous tissue without\par significant pathologic features\par Addendum\par Invasive tumor spans 4mm in this material (greatest contiguous\par microscopic measurement).\par \par A repeat sentinel node biopsy was attempted but did not localize tracer.  \par \par She is at home with her  as she undergoes a consult via telemedicine.\par

## 2020-05-27 NOTE — HISTORY OF PRESENT ILLNESS
[FreeTextEntry1] : 5/20/20 - OTV - Ms. Matos has completed 2915 cGy / 4240 cGy to the right chest wall.  Today, she reports feeling well.  She is without complaints.  She is using Aquaphor.  Worries that she is not washing it off well enough. \par \par 5/13/20 - OTV - Ms. Matos has completed 1590 cGy / 4240 cGy to the right chest wall.  She reports feeling very tired after radiation yesterday with myalgias.  She denies fevers, chills, cough, rhinorrhea, dyspnea.  She is feeling better today.  She denies skin irritation.  She is using Aquaphor and aloe vera from the plant.  \par \par 5/6/20 - OTV - Ms. Matos has completed 265 cGy / 4240 cGy to the right chest wall.  Today, she reports feeling well.  She notes mild  burning pain at her incision site.  Otherwise without complaints.  She started Anastrazole.  Has questions about using biofreeze on her shoulders and medications. \par __________________________________________________\par ONCOLOGIC HISTORY (3/27/20)\par Ms. Jenny Matos is a 54 year-old female referred by Dr. Ivan for consideration of radiation therapy for recurrent right infiltrating ductal carcinoma ER/OH positive, HER-2 negative s/p right mastectomy with SLNB and VICENTE in 2015 and right chest wall excision on 3/17/20.  She has a history of multifocal pT1bN0 invasive ductal carcinoma (0.6 cm) ER/OH positive, HER-2 negative and pT1bN0 infiltrating lobular carcinoma (0.8 cm) ER positive, OH negative, HER-2 negative with DCIS s/p right mastectomy with SLNB and VICENTE by Dr. Bustos and Dr. Soria in 2015.  She underwent right breast skin biopsy in May 2017; pathology showed benign breast tissue with dermal fibrosis and foreign body type giant cell reaction.  She is on Tamoxifen under the care of Dr. Gamez and expects to switch to anastrozole.   \par \par MRI of the breasts done 2/18/2020 demonstrated a new 0.5 cm enhancing lesion in the upper outer quadrant of the reconstructed right breast (BI-RADS 4B).  US guided biopsy was recommended.  She underwent targeted right breast ultrasound on 2/20/20, which showed a hypoechoic lesion at the 10:00 position of the reconstructed right breast, which likely corresponds to the enhancing lesion on MRI, and US guided biopsy of the lesion at 10:00, 4.4 cm from the nipple.  Pathology confirmed well-differentiated infiltrating ductal carcinoma, ER/OH positive, HER-2 negative.  \par \par She underwent right chest wall excision by Dr. Ivan on 3/17/20.  Pathology as follows:\par Final Diagnosis\par 1. Anterior margin, right breast, excision:\par - Negative for tumor; skin without significant pathologic\par features\par 2. Right chest wall cancer, excision:\par - Infiltrating ductal carcinoma, recurrent\par - No tumor seen on inked margins\par - Subcutaneous tissue and extremely scanty mammary tissue\par present\par - Biopsy site changes\par 3. Lateral margin, right breast, excision:\par - Negative for tumor; adipose tissue, unremarkable\par 4. Inferior margin, right breast, excision:\par - Negative for tumor; adipose tissue with focal histiocytic\par proliferation\par 5. Medial margin, right breast, excision:\par - Negative for tumor; subcutaneous tissue without\par significant pathological features\par 6. Superior margin, right breast, excision:\par - Negative for tumor; subcutaneous tissue without\par significant pathologic features\par Addendum\par Invasive tumor spans 4mm in this material (greatest contiguous\par microscopic measurement).\par \par A repeat sentinel node biopsy was attempted but did not localize tracer.  \par \par She is at home with her  as she undergoes a consult via telemedicine.\par

## 2020-05-27 NOTE — PHYSICAL EXAM
[Outer Ear] : the ears and nose were normal in appearance [] : no respiratory distress [Hearing Threshold Finger Rub Not [place]] : hearing was normal [Auscultation Breath Sounds / Voice Sounds] : lungs were clear to auscultation bilaterally [Respiration, Rhythm And Depth] : normal respiratory rhythm and effort [Heart Rate And Rhythm] : heart rate and rhythm were normal [Heart Sounds] : normal S1 and S2 [Normal] : oriented to person, place and time, the affect was normal, the mood was normal and not anxious [de-identified] : There is a very faint erythema developing in the field.   On the whole there are no suspicious findings.  [de-identified] : faint erythema right chest wall

## 2020-05-27 NOTE — DISEASE MANAGEMENT
[Pathological] : TNM Stage: p [IV] : IV [FreeTextEntry4] : Recurrent breast cancer [TTNM] : . [NTNM] : . [MTNM] : . [de-identified] : 7515 cGy [de-identified] : 1598 cGy [de-identified] : Right chest wall

## 2020-05-27 NOTE — PHYSICAL EXAM
[Hearing Threshold Finger Rub Not [place]] : hearing was normal [Outer Ear] : the ears and nose were normal in appearance [] : no respiratory distress [Respiration, Rhythm And Depth] : normal respiratory rhythm and effort [Heart Rate And Rhythm] : heart rate and rhythm were normal [Heart Sounds] : normal S1 and S2 [Sclera] : the sclera and conjunctiva were normal [Normal] : normoactive bowel sounds, soft and nontender, no hepatosplenomegaly or masses appreciated [de-identified] : The erythema in the field is still quite faint.  On the whole there are no suspicious findings.  [de-identified] : faint erythema right chest wall.

## 2020-05-27 NOTE — HISTORY OF PRESENT ILLNESS
[FreeTextEntry1] : 5/27/20 - OTV - Ms. Matos has completed 3975 cGy / 4240 cGy to the right chest wall.  Today, she reports she feels pretty well. She notes slight hyperpigmentation to right chest wall/ axilla, but otherwise denies skin irritation or pruritus. Denies pain. Using Aquaphor twice daily. Also notes fatigue over the past few days.  Her chief issue is trouble sleeping and feeling tired. \par \par 5/20/20 - OTV - Ms. Matos has completed 2915 cGy / 4240 cGy to the right chest wall.  Today, she reports feeling well.  She is without complaints.  She is using Aquaphor.  Worries that she is not washing it off well enough. \par \par 5/13/20 - OTV - Ms. Matos has completed 1590 cGy / 4240 cGy to the right chest wall.  She reports feeling very tired after radiation yesterday with myalgias.  She denies fevers, chills, cough, rhinorrhea, dyspnea.  She is feeling better today.  She denies skin irritation.  She is using Aquaphor and aloe vera from the plant.  \par \par 5/6/20 - OTV - Ms. Matos has completed 265 cGy / 4240 cGy to the right chest wall.  Today, she reports feeling well.  She notes mild  burning pain at her incision site.  Otherwise without complaints.  She started Anastrazole.  Has questions about using biofreeze on her shoulders and medications. \par __________________________________________________\par ONCOLOGIC HISTORY (3/27/20)\par Ms. Jenny Matos is a 54 year-old female referred by Dr. Ivan for consideration of radiation therapy for recurrent right infiltrating ductal carcinoma ER/MI positive, HER-2 negative s/p right mastectomy with SLNB and VICENTE in 2015 and right chest wall excision on 3/17/20.  She has a history of multifocal pT1bN0 invasive ductal carcinoma (0.6 cm) ER/MI positive, HER-2 negative and pT1bN0 infiltrating lobular carcinoma (0.8 cm) ER positive, MI negative, HER-2 negative with DCIS s/p right mastectomy with SLNB and VICENTE by Dr. Bustos and Dr. Soria in 2015.  She underwent right breast skin biopsy in May 2017; pathology showed benign breast tissue with dermal fibrosis and foreign body type giant cell reaction.  She is on Tamoxifen under the care of Dr. Gamez and expects to switch to anastrozole.   \par \par MRI of the breasts done 2/18/2020 demonstrated a new 0.5 cm enhancing lesion in the upper outer quadrant of the reconstructed right breast (BI-RADS 4B).  US guided biopsy was recommended.  She underwent targeted right breast ultrasound on 2/20/20, which showed a hypoechoic lesion at the 10:00 position of the reconstructed right breast, which likely corresponds to the enhancing lesion on MRI, and US guided biopsy of the lesion at 10:00, 4.4 cm from the nipple.  Pathology confirmed well-differentiated infiltrating ductal carcinoma, ER/MI positive, HER-2 negative.  \par \par She underwent right chest wall excision by Dr. Ivan on 3/17/20.  Pathology as follows:\par Final Diagnosis\par 1. Anterior margin, right breast, excision:\par - Negative for tumor; skin without significant pathologic\par features\par 2. Right chest wall cancer, excision:\par - Infiltrating ductal carcinoma, recurrent\par - No tumor seen on inked margins\par - Subcutaneous tissue and extremely scanty mammary tissue\par present\par - Biopsy site changes\par 3. Lateral margin, right breast, excision:\par - Negative for tumor; adipose tissue, unremarkable\par 4. Inferior margin, right breast, excision:\par - Negative for tumor; adipose tissue with focal histiocytic\par proliferation\par 5. Medial margin, right breast, excision:\par - Negative for tumor; subcutaneous tissue without\par significant pathological features\par 6. Superior margin, right breast, excision:\par - Negative for tumor; subcutaneous tissue without\par significant pathologic features\par Addendum\par Invasive tumor spans 4mm in this material (greatest contiguous\par microscopic measurement).\par \par A repeat sentinel node biopsy was attempted but did not localize tracer.  \par \par She is at home with her  as she undergoes a consult via telemedicine.\par

## 2020-05-27 NOTE — REVIEW OF SYSTEMS
[Fatigue: Grade 1 - Fatigue relieved by rest] : Fatigue: Grade 1 - Fatigue relieved by rest [Localized Edema: Grade 0] : Localized Edema: Grade 0  [Breast Pain: Grade 0] : Breast Pain: Grade 0 [Dermatitis Radiation: Grade 1 - Faint erythema or dry desquamation] : Dermatitis Radiation: Grade 1 - Faint erythema or dry desquamation

## 2020-06-22 ENCOUNTER — APPOINTMENT (OUTPATIENT)
Dept: BREAST CENTER | Facility: CLINIC | Age: 55
End: 2020-06-22
Payer: COMMERCIAL

## 2020-06-22 VITALS
HEART RATE: 89 BPM | HEIGHT: 61 IN | OXYGEN SATURATION: 98 % | SYSTOLIC BLOOD PRESSURE: 111 MMHG | DIASTOLIC BLOOD PRESSURE: 64 MMHG | RESPIRATION RATE: 16 BRPM

## 2020-06-22 DIAGNOSIS — F41.9 ANXIETY DISORDER, UNSPECIFIED: ICD-10-CM

## 2020-06-22 PROCEDURE — 99213 OFFICE O/P EST LOW 20 MIN: CPT

## 2020-06-23 ENCOUNTER — APPOINTMENT (OUTPATIENT)
Dept: BREAST CENTER | Facility: CLINIC | Age: 55
End: 2020-06-23

## 2020-06-24 ENCOUNTER — APPOINTMENT (OUTPATIENT)
Dept: RADIATION ONCOLOGY | Facility: CLINIC | Age: 55
End: 2020-06-24
Payer: COMMERCIAL

## 2020-06-24 VITALS
DIASTOLIC BLOOD PRESSURE: 79 MMHG | HEART RATE: 91 BPM | OXYGEN SATURATION: 99 % | RESPIRATION RATE: 17 BRPM | SYSTOLIC BLOOD PRESSURE: 110 MMHG

## 2020-06-24 PROCEDURE — 99024 POSTOP FOLLOW-UP VISIT: CPT

## 2020-06-24 RX ORDER — ANASTROZOLE TABLETS 1 MG/1
1 TABLET ORAL
Refills: 0 | Status: ACTIVE | COMMUNITY

## 2020-06-24 NOTE — HISTORY OF PRESENT ILLNESS
[FreeTextEntry1] : Ms. Jenny Matos has completed radiation therapy to the RIGHT chest wall for a total of 4240 cGy over 16 fractions from 5/6/2020 to 5/28/2020 for a recurrent right infiltrating ductal carcinoma ER/NH positive, HER-2 negative, s/p right mastectomy with SLNB and VICENTE in 2015 and right chest wall excision on 3/17/20. She tolerated her radiation well and did not develop any grade 3 or higher acute toxicities as a result of treatment. She is taking Anastrozole under the care of Dr. Gamez. \par \par 6/24/2020- PTE\par Ms. Matos returns for post treatment evaluation. She followed up with Dr. Ivan on 6/22/2020; plan is for MRI and ultrasound in Feb 2021 (patient will continue care with Dr. Campbell). She is scheduled to follow up with Dr. Gamez in August; continues on Anastrozole.  Today, she reports intermittent soreness, mostly to lateral right CW/ axilla, as well as intermittent poking/ pricking pain to incision site. She notes hyperpigmentation is improving. She also reports difficulty sleeping due to "racing thoughts" and was prescribed Venlafaxine by Dr. Ivan. No other complaints or interval medical events. Denies edema, CP, SOB. \par \par \par __________________________________________________\par ONCOLOGIC HISTORY (3/27/20)\par Ms. Jenny Matos is a 54 year-old female referred by Dr. Ivan for consideration of radiation therapy for recurrent right infiltrating ductal carcinoma ER/NH positive, HER-2 negative s/p right mastectomy with SLNB and VICENTE in 2015 and right chest wall excision on 3/17/20.  She has a history of multifocal pT1bN0 invasive ductal carcinoma (0.6 cm) ER/NH positive, HER-2 negative and pT1bN0 infiltrating lobular carcinoma (0.8 cm) ER positive, [place] negative, HER-2 negative with DCIS s/p right mastectomy with SLNB and VICENTE by Dr. Bustos and Dr. Soria in 2015.  She underwent right breast skin biopsy in May 2017; pathology showed benign breast tissue with dermal fibrosis and foreign body type giant cell reaction.  She is on Tamoxifen under the care of Dr. Gamez and expects to switch to anastrozole.   \par \par MRI of the breasts done 2/18/2020 demonstrated a new 0.5 cm enhancing lesion in the upper outer quadrant of the reconstructed right breast (BI-RADS 4B).  US guided biopsy was recommended.  She underwent targeted right breast ultrasound on 2/20/20, which showed a hypoechoic lesion at the 10:00 position of the reconstructed right breast, which likely corresponds to the enhancing lesion on MRI, and US guided biopsy of the lesion at 10:00, 4.4 cm from the nipple.  Pathology confirmed well-differentiated infiltrating ductal carcinoma, ER/NH positive, HER-2 negative.  \par \par She underwent right chest wall excision by Dr. Ivan on 3/17/20.  Pathology as follows:\par Final Diagnosis\par 1. Anterior margin, right breast, excision:\par - Negative for tumor; skin without significant pathologic\par features\par 2. Right chest wall cancer, excision:\par - Infiltrating ductal carcinoma, recurrent\par - No tumor seen on inked margins\par - Subcutaneous tissue and extremely scanty mammary tissue\par present\par - Biopsy site changes\par 3. Lateral margin, right breast, excision:\par - Negative for tumor; adipose tissue, unremarkable\par 4. Inferior margin, right breast, excision:\par - Negative for tumor; adipose tissue with focal histiocytic\par proliferation\par 5. Medial margin, right breast, excision:\par - Negative for tumor; subcutaneous tissue without\par significant pathological features\par 6. Superior margin, right breast, excision:\par - Negative for tumor; subcutaneous tissue without\par significant pathologic features\par Addendum\par Invasive tumor spans 4mm in this material (greatest contiguous\par microscopic measurement).\par \par A repeat sentinel node biopsy was attempted but did not localize tracer.  \par \par She is at home with her  as she undergoes a consult via telemedicine.

## 2020-06-24 NOTE — PHYSICAL EXAM
[Sclera] : the sclera and conjunctiva were normal [Outer Ear] : the ears and nose were normal in appearance [Hearing Threshold Finger Rub Not [place]] : hearing was normal [] : no respiratory distress [Respiration, Rhythm And Depth] : normal respiratory rhythm and effort [Heart Sounds] : normal S1 and S2 [Heart Rate And Rhythm] : heart rate and rhythm were normal [Normal] : normoactive bowel sounds, soft and nontender, no hepatosplenomegaly or masses appreciated [Auscultation Breath Sounds / Voice Sounds] : lungs were clear to auscultation bilaterally [Extraocular Movements] : extraocular movements were intact [Oriented To Time, Place, And Person] : oriented to person, place, and time [de-identified] : Modest hyperpigmentation to right breast, slight tenderness upon palpation of the lumpectomy site.   On the whole there are no suspicious findings.  [de-identified] : Hyperpigmentation to right chest wall.

## 2020-06-24 NOTE — REVIEW OF SYSTEMS
[Localized Edema: Grade 0] : Localized Edema: Grade 0  [Fatigue: Grade 1 - Fatigue relieved by rest] : Fatigue: Grade 1 - Fatigue relieved by rest [Breast Pain: Grade 1 - Mild pain] : Breast Pain: Grade 1 - Mild pain [Skin Hyperpigmentation: Grade 1 - Hyperpigmentation covering <10% BSA; no psychosocial impact] : Skin Hyperpigmentation: Grade 1 - Hyperpigmentation covering <10% BSA; no psychosocial impact [Dermatitis Radiation: Grade 0] : Dermatitis Radiation: Grade 0

## 2020-06-24 NOTE — VITALS
[Least Pain Intensity: 0/10] : 0/10 [NoTreatment Scheduled] : no treatment scheduled [90: Able to carry normal activity; minor signs or symptoms of disease.] : 90: Able to carry normal activity; minor signs or symptoms of disease.  [Maximal Pain Intensity: 2/10] : 2/10 [Pain Description/Quality: ___] : Pain description/quality: [unfilled] [Pain Duration: ___] : Pain duration: [unfilled] [Pain Location: ___] : Pain Location: [unfilled]

## 2020-06-24 NOTE — DISEASE MANAGEMENT
[Pathological] : TNM Stage: p [IV] : IV [FreeTextEntry4] : Recurrent breast cancer [TTNM] : . [NTNM] : . [MTNM] : . [de-identified] : Right chest wall

## 2021-03-11 ENCOUNTER — APPOINTMENT (OUTPATIENT)
Dept: BREAST CENTER | Facility: CLINIC | Age: 56
End: 2021-03-11
Payer: COMMERCIAL

## 2021-03-11 VITALS — WEIGHT: 118 LBS | HEIGHT: 61 IN | BODY MASS INDEX: 22.28 KG/M2

## 2021-03-11 DIAGNOSIS — Z00.00 ENCOUNTER FOR GENERAL ADULT MEDICAL EXAMINATION W/OUT ABNORMAL FINDINGS: ICD-10-CM

## 2021-03-11 PROCEDURE — 99213 OFFICE O/P EST LOW 20 MIN: CPT

## 2021-03-11 PROCEDURE — 99072 ADDL SUPL MATRL&STAF TM PHE: CPT

## 2021-03-11 RX ORDER — VENLAFAXINE HYDROCHLORIDE 75 MG/1
75 CAPSULE, EXTENDED RELEASE ORAL DAILY
Qty: 60 | Refills: 3 | Status: DISCONTINUED | COMMUNITY
Start: 2020-06-22 | End: 2021-03-11

## 2021-03-12 RX ORDER — DENOSUMAB 60 MG/ML
INJECTION SUBCUTANEOUS
Refills: 0 | Status: ACTIVE | COMMUNITY

## 2021-03-23 NOTE — PHYSICAL EXAM
[Supple] : supple [No Supraclavicular Adenopathy] : no supraclavicular adenopathy [No Cervical Adenopathy] : no cervical adenopathy [Examined in the supine and seated position] : examined in the supine and seated position [No dominant masses] : no dominant masses left breast [No Nipple Retraction] : no left nipple retraction [No Nipple Discharge] : no left nipple discharge [No Axillary Lymphadenopathy] : no left axillary lymphadenopathy [de-identified] : TM with VICENTE reconstruction with well-healed scar from 2020 excision and radiation changes

## 2021-03-23 NOTE — DATA REVIEWED
[FreeTextEntry1] : 2/18/2020 MRI ([facility]): right, reconstructed breast, 0.5cm enhancing suspicious lesion in the UOQ, 4n4cm from the reconstructed nipple, correlation w/ US and US guided biopsy is recommended. Left breast, no suspicious enhancement. BI-RADS 4B.  \par \par 2/20/2020 Right US ([facility]): hypoechoic lesion at 10:00 in the reconstructed breast. US guided biopsy is recommended and was performed on the same date. BI-RADS 4B.  \par \par 2/20/2020 Right US biopsy 10:00 4.4FN ([facility]): infiltrating ductal carcinoma, well differentiated. IHC is pending.  \par \par 3/17/2020 R Chest wall excision with SLNB: ([facility] with Dr. Ivan): 0.4cm recurrent infiltrating ductal carcinoma, negative margins

## 2021-03-23 NOTE — HISTORY OF PRESENT ILLNESS
[FreeTextEntry1] : 56 yo BRCA (-) female, previously under the care of Dr. Martita Ivan, presents for follow up and to establish care; she is/p right excision of recurrent breast cancer on 3/17/20 for infiltrating ductal carcinoma ER 90%, GA 50%, HER-2 negative. Final surgical pathology revealed infiltrating ductal carcinoma, recurrent, spanning 4mm.  \par \par She has a previous history of multifocal invasive ductal carcinoma (0.6cm) ER/GA positive, HER-2 negative and infiltrating lobular carcinoma (0.8cm) ER positive, GA negative, HER-2 negative with DCIS s/p right mastectomy w/ SLNB and VICENTE in 2015. She is under the care of Dr. Gamez (medical oncologist) and is taking Anastrazole. Patient was diagnosed with osteoporosis for which she is getting injections.\par \par At LOV w/ Dr. Ivan on 6/22/20 it was recommended that patient begin anti-depressants to manage side effects from Tamoxifen and patient was started on Venlafaxine which patient did not continue. She admits to having continued depression for which she is going to begin taking medical marijuana.\par \par

## 2021-03-23 NOTE — PAST MEDICAL HISTORY
[Menarche Age ____] : age at menarche was [unfilled] [Menopause Age____] : age at menopause was [unfilled] [History of Hormone Replacement Treatment] : has a history of hormone replacement treatment [Total Preg ___] : G[unfilled] [Live Births ___] : P[unfilled]  [Abortions ___] : Abortions:[unfilled] [Age At Live Birth ___] : Age at live birth: [unfilled] [FreeTextEntry2] : miscarriage  4 [FreeTextEntry6] : yes   [FreeTextEntry7] : no [FreeTextEntry8] : yes

## 2021-03-23 NOTE — ASSESSMENT
[FreeTextEntry1] : 54 yo BRCA (-) female, previously under the care of Dr. Martita Ivan, presents for follow up and to establish care; she is/p right excision of recurrent breast cancer on 3/17/20 for infiltrating ductal carcinoma ER 90%, AL 50%, HER-2 negative. Final surgical pathology revealed infiltrating ductal carcinoma, recurrent, spanning 4mm.  \par \par She has a previous history of multifocal invasive ductal carcinoma (0.6cm) ER/AL positive, HER-2 negative and infiltrating lobular carcinoma (0.8cm) ER positive, AL negative, HER-2 negative with DCIS s/p right mastectomy w/ SLNB and VICENTE in 2015. She is under the care of Dr. Gamez (medical oncologist) and is taking Anastrazole. Patient was diagnosed with osteoporosis for which she is getting injections.\par \par At LOV w/ Dr. Ivan on 6/22/20 it was recommended that patient begin anti-depressants to manage side effects from Tamoxifen and patient was started on Venlafaxine which patient did not continue. She admits to having continued depression for which she is going to begin taking medical marijuana.\par \par Physical examination reveals R TM with VICENET flap reconstruction with well-healed scar from 2020 excision.\par \par Patient is due for annual imaging and will have mammo/sono and MRI now and return in 6mos for re-examination.

## 2021-04-07 ENCOUNTER — NON-APPOINTMENT (OUTPATIENT)
Age: 56
End: 2021-04-07

## 2021-05-05 ENCOUNTER — NON-APPOINTMENT (OUTPATIENT)
Age: 56
End: 2021-05-05

## 2021-08-10 ENCOUNTER — APPOINTMENT (OUTPATIENT)
Dept: PLASTIC SURGERY | Facility: CLINIC | Age: 56
End: 2021-08-10
Payer: COMMERCIAL

## 2021-08-10 VITALS — BODY MASS INDEX: 22.66 KG/M2 | HEART RATE: 76 BPM | OXYGEN SATURATION: 96 % | HEIGHT: 61 IN | WEIGHT: 120 LBS

## 2021-08-10 DIAGNOSIS — L90.5 SCAR CONDITIONS AND FIBROSIS OF SKIN: ICD-10-CM

## 2021-08-10 DIAGNOSIS — Z90.11 ACQUIRED ABSENCE OF RIGHT BREAST AND NIPPLE: ICD-10-CM

## 2021-08-10 DIAGNOSIS — Z42.1 ENCOUNTER FOR BREAST RECONSTRUCTION FOLLOWING MASTECTOMY: ICD-10-CM

## 2021-08-10 PROCEDURE — 99213 OFFICE O/P EST LOW 20 MIN: CPT

## 2021-08-19 PROBLEM — L90.5 SCAR TISSUE: Status: ACTIVE | Noted: 2021-08-10

## 2021-08-19 NOTE — SURGICAL HISTORY
[de-identified] : 12-23-15 Right Mastectomy and VICENTE flap  [de-identified] : 5-12-17 - Right nipple reconstruction, Revision of right breast with liposuction and plication of the inframammary fold

## 2021-08-19 NOTE — ASSESSMENT
[FreeTextEntry1] : well healed. Not interested in nipple areola tattoo. \par Surgical intervention for abdominal donor site bulge with mesh was discussed, the risk is that the bulge can recur and there is no guarantee it will alleviate her discomfort. I encouraged exercise, stretching and massage and acupuncture. She should continue to undergo routine breast exam and follow up with her breast surgeon. If she decides to move forward with surgery she will reach out to us. Otherwise, Return to the office PRN.

## 2021-08-19 NOTE — HISTORY OF PRESENT ILLNESS
[FreeTextEntry1] : 57 yo female s/p right mastectomy and VICENTE flap recon in December 2015 with Rt nipple reconstruction and right breast revision in May 2017 presents c/o abdominal discomfort that started last month. She has been wearing an abdominal binder which helps her symptoms. She goes to PT regularly. She had recurrent cancer w/ lumpectomy in 2020 w/ Dr. Ivan.

## 2021-08-19 NOTE — PHYSICAL EXAM
[de-identified] : Right reconstructed breast mound soft, The reconstructed nipple is viable and well healed with good positioning of the inframammary fold there is no sign of any infection or collection. the right nipple is significantly smaller than the left. [de-identified] : Soft, NT, ND, no masses, no hernias, transverse incision well healed. +left abdominal donor site bulge consistant with eventration of the donor site - no hernia sack

## 2021-09-09 ENCOUNTER — APPOINTMENT (OUTPATIENT)
Dept: BREAST CENTER | Facility: CLINIC | Age: 56
End: 2021-09-09
Payer: COMMERCIAL

## 2021-09-09 VITALS
BODY MASS INDEX: 22.09 KG/M2 | WEIGHT: 117 LBS | SYSTOLIC BLOOD PRESSURE: 126 MMHG | DIASTOLIC BLOOD PRESSURE: 80 MMHG | HEIGHT: 61 IN | HEART RATE: 73 BPM

## 2021-09-09 DIAGNOSIS — C50.911 MALIGNANT NEOPLASM OF UNSPECIFIED SITE OF RIGHT FEMALE BREAST: ICD-10-CM

## 2021-09-09 DIAGNOSIS — Z87.898 PERSONAL HISTORY OF OTHER SPECIFIED CONDITIONS: ICD-10-CM

## 2021-09-09 PROCEDURE — 99213 OFFICE O/P EST LOW 20 MIN: CPT

## 2021-09-13 NOTE — HISTORY OF PRESENT ILLNESS
[FreeTextEntry1] : 57yo BRCA (-) female LOV 3/11/21 presents for f/u evaluation. Patient was previously under the care of Dr. Martita Ivan, presents for follow up and to establish care; she is/p right excision of recurrent breast cancer on 3/17/20 for infiltrating ductal carcinoma ER 90%, [place] 50%, HER-2 negative. Final surgical pathology revealed infiltrating ductal carcinoma, recurrent, spanning 4mm.  \par \par Most recently, patient had BIRADS 0 MRI which was further compared with prior images and found to be stable and regraded as BIRADS 2. \par \par She has a previous history of multifocal invasive ductal carcinoma (0.6cm) ER/NJ positive, HER-2 negative and infiltrating lobular carcinoma (0.8cm) ER positive, NJ negative, HER-2 negative with DCIS s/p right mastectomy w/ SLNB and VICENTE in 2015. She is under the care of Dr. Gamez (medical oncologist) and is taking Anastrazole. Patient was diagnosed with osteoporosis for which she is getting injections.\par \par Patient continues to follow with Dr. Lerman \par \par

## 2021-09-13 NOTE — ASSESSMENT
[FreeTextEntry1] : 57yo BRCA (-) female LOV 3/11/21 presents for f/u evaluation. Patient was previously under the care of Dr. Martita Ivan, presents for follow up and to establish care; she is/p right excision of recurrent breast cancer on 3/17/20 for infiltrating ductal carcinoma ER 90%, [place] 50%, HER-2 negative. Final surgical pathology revealed infiltrating ductal carcinoma, recurrent, spanning 4mm.  \par \par Patient to return in March for L MG & US and reexamination.

## 2021-09-13 NOTE — PHYSICAL EXAM
[Supple] : supple [No Supraclavicular Adenopathy] : no supraclavicular adenopathy [No Cervical Adenopathy] : no cervical adenopathy [Examined in the supine and seated position] : examined in the supine and seated position [No dominant masses] : no dominant masses left breast [No Nipple Retraction] : no left nipple retraction [No Nipple Discharge] : no left nipple discharge [No Axillary Lymphadenopathy] : no left axillary lymphadenopathy [de-identified] : TM with VICENTE reconstruction with well-healed scar from 2020 excision and radiation changes

## 2021-09-13 NOTE — DATA REVIEWED
[FreeTextEntry1] : 2/18/2020 MRI ([facility]): right, reconstructed breast, 0.5cm enhancing suspicious lesion in the UOQ, 4n4cm from the reconstructed nipple, correlation w/ US and US guided biopsy is recommended. Left breast, no suspicious enhancement. BI-RADS 4B.  \par \par 2/20/2020 Right US ([facility]): hypoechoic lesion at 10:00 in the reconstructed breast. US guided biopsy is recommended and was performed on the same date. BI-RADS 4B.  \par \par 2/20/2020 Right US biopsy 10:00 4.4FN ([facility]): infiltrating ductal carcinoma, well differentiated. IHC is pending.  \par \par 3/17/2020 R Chest wall excision with SLNB: ([facility] with Dr. Ivan): 0.4cm recurrent infiltrating ductal carcinoma, negative margins \par \par 3/25/21: L MG & Rene US ([facility]): heterogeneously dense, US – R – no suspicious findings, scars noted 6:00 and 10:00, scar from the drain is visible with minima changes in the skin on sonography 8:00 8FN which may correlate w/ tiny focus of enhancement on MRI. BIRADS 2.  \par \par 3/25/21: MRI ([facility]): heteorgeneously dense fibroglandular tissue on left with minimal background parenchymal enhancement, R – TRAM flap reconstruction, post-lumpx scarring seen in addition to reconstructed breast, peripherally in UOQ 10:00 2cm above scar there is a 0.5cm focus of enhancement which appears to be related to a vascular structure, 0.4cm focus of enhancement assoc w/ skin 8:00 far peripherally which seems to be location of previous drain, L – two foci of enhancement in the lateral breast at approx the level of the nipple. BIRADS 0. - Upon comparison with prior images it was found that he 5 mm enhancing nodule in the right upper outer quadrant, at 10:00, above the scar, is stable.  \par The tiny focus of enhancement associated with the skin at 8:00, far peripherally, was not present on the prior study; however it is very tiny and does not raise concern at this time. It could be related to some of the subcutaneous vessels or the scar from the drain

## 2022-03-29 ENCOUNTER — APPOINTMENT (OUTPATIENT)
Dept: BREAST CENTER | Facility: CLINIC | Age: 57
End: 2022-03-29
Payer: COMMERCIAL

## 2022-03-29 VITALS
HEIGHT: 61 IN | WEIGHT: 122 LBS | SYSTOLIC BLOOD PRESSURE: 146 MMHG | BODY MASS INDEX: 23.03 KG/M2 | DIASTOLIC BLOOD PRESSURE: 94 MMHG | HEART RATE: 78 BPM

## 2022-03-29 DIAGNOSIS — Z78.9 OTHER SPECIFIED HEALTH STATUS: ICD-10-CM

## 2022-03-29 DIAGNOSIS — C50.911 MALIGNANT NEOPLASM OF UNSPECIFIED SITE OF RIGHT FEMALE BREAST: ICD-10-CM

## 2022-03-29 DIAGNOSIS — Z12.39 ENCOUNTER FOR OTHER SCREENING FOR MALIGNANT NEOPLASM OF BREAST: ICD-10-CM

## 2022-03-29 DIAGNOSIS — C79.89 MALIGNANT NEOPLASM OF UNSPECIFIED SITE OF RIGHT FEMALE BREAST: ICD-10-CM

## 2022-03-29 PROCEDURE — 99213 OFFICE O/P EST LOW 20 MIN: CPT

## 2022-03-29 RX ORDER — CHROMIUM 200 MCG
TABLET ORAL
Refills: 0 | Status: ACTIVE | COMMUNITY

## 2022-04-02 NOTE — ASSESSMENT
[FreeTextEntry1] : 55yo BRCA (-) female LOV 3/11/21 presents for f/u evaluation s/p right excision of recurrent breast cancer on 3/17/20 for infiltrating ductal carcinoma ER 90%, [place] 50%, HER-2 negative. Left screening MMG/US performed today BIRADS 2 - Benign. Given patient is at increased risk of recurrance, she will have B/L MRI w/wo Contrast in October 2022 and RTC same month for reexamination. Patient verbalized understanding and agreement of plan.\par

## 2022-04-02 NOTE — DATA REVIEWED
[FreeTextEntry1] : 2/18/2020 MRI ([facility]): right, reconstructed breast, 0.5cm enhancing suspicious lesion in the UOQ, 4n4cm from the reconstructed nipple, correlation w/ US and US guided biopsy is recommended. Left breast, no suspicious enhancement. BI-RADS 4B.  \par \par 2/20/2020 Right US ([facility]): hypoechoic lesion at 10:00 in the reconstructed breast. US guided biopsy is recommended and was performed on the same date. BI-RADS 4B.  \par \par 2/20/2020 Right US biopsy 10:00 4.4FN ([facility]): infiltrating ductal carcinoma, well differentiated. IHC is pending.  \par \par 3/17/2020 R Chest wall excision with SLNB: ([facility] with Dr. Ivan): 0.4cm recurrent infiltrating ductal carcinoma, negative margins \par \par 3/25/21: L MG & Rene US ([facility]): heterogeneously dense, US – R – no suspicious findings, scars noted 6:00 and 10:00, scar from the drain is visible with minima changes in the skin on sonography 8:00 8FN which may correlate w/ tiny focus of enhancement on MRI. BIRADS 2.  \par \par 3/25/21: MRI ([facility]): heteorgeneously dense fibroglandular tissue on left with minimal background parenchymal enhancement, R – TRAM flap reconstruction, post-lumpx scarring seen in addition to reconstructed breast, peripherally in UOQ 10:00 2cm above scar there is a 0.5cm focus of enhancement which appears to be related to a vascular structure, 0.4cm focus of enhancement assoc w/ skin 8:00 far peripherally which seems to be location of previous drain, L – two foci of enhancement in the lateral breast at approx the level of the nipple. BIRADS 0. - Upon comparison with prior images it was found that he 5 mm enhancing nodule in the right upper outer quadrant, at 10:00, above the scar, is stable.  \par The tiny focus of enhancement associated with the skin at 8:00, far peripherally, was not present on the prior study; however it is very tiny and does not raise concern at this time. It could be related to some of the subcutaneous vessels or the scar from the drain \par \par 3/29/22 L Screening MMG/US ([facility]): Heterogeneously dense. No mammographic or ultrasonographic evidence of malignancy. No significant change. BIRADS 2 - Benign. Follow up imaging in 12 months.

## 2022-04-02 NOTE — REVIEW OF SYSTEMS
[Negative] : Constitutional [As Noted in HPI] : as noted in HPI [Shortness Of Breath] : no shortness of breath [Skin Lesions] : no skin lesions [Skin Wound] : no skin wound

## 2022-04-02 NOTE — REASON FOR VISIT
[Follow-Up: _____] : a [unfilled] follow-up visit [FreeTextEntry1] : annual breast cancer surveillance s/p R mastectomy with VICENTE recon and recurrence with excision

## 2022-04-02 NOTE — HISTORY OF PRESENT ILLNESS
[FreeTextEntry1] : 57 yo BRCA (-) female LOV 3/11/21 presents for f/u evaluation. Patient was previously under the care of Dr. Martita Ivan, presents for follow up and to establish care; she is/p right excision of recurrent breast cancer on 3/17/20 for infiltrating ductal carcinoma ER 90%, [place] 50%, HER-2 negative. Final surgical pathology revealed infiltrating ductal carcinoma, recurrent, spanning 4mm.  Of note, recurrence and excision took place after patient's R mastectomy and VICENTE flap reconstruction. \par \par She has a previous history of multifocal invasive ductal carcinoma (0.6cm) ER/[place] positive, HER-2 negative and infiltrating lobular carcinoma (0.8cm) ER positive, [place] negative, HER-2 negative with DCIS s/p right mastectomy w/ SLNB and VICENTE in 2015. She is under the care of Dr. Gamez (medical oncologist) and is taking Anastrazole. Patient was diagnosed with osteoporosis for which she is getting injections.\par \par Patient continues to follow with Dr. Lerman. \par

## 2022-04-02 NOTE — PHYSICAL EXAM
[Supple] : supple [No Supraclavicular Adenopathy] : no supraclavicular adenopathy [No Cervical Adenopathy] : no cervical adenopathy [Examined in the supine and seated position] : examined in the supine and seated position [No dominant masses] : no dominant masses left breast [No Nipple Retraction] : no left nipple retraction [No Nipple Discharge] : no left nipple discharge [No Axillary Lymphadenopathy] : no left axillary lymphadenopathy [Asymmetrical] : asymmetrical [No Rashes] : no rashes [No Ulceration] : no ulceration [de-identified] : TM with VICENTE reconstruction with well-healed scar from 2020 excision and radiation changes

## 2022-07-22 NOTE — ASU PATIENT PROFILE, ADULT - CAREGIVER NAME
[id number]     Shayy Denney MRN: [id number]  AGE: 89 year old  ADMIT DATE: 7/17/2022    CODE STATUS: Do Not Resuscitate  ISOLATION STATUS: No active isolations   DIET: One Time Diet Cardiac  2 Times/day W Lunch & Dinner; Ensure Enlive/standard Oral Supplement, Shanks Oral Nutrition Supplement  Regular, Soft For Dentition Chop; Fluid Restrict 1500ml (900 From Dietary) Diet    ALLERGIES:  Patient has no known allergies.     DX:Urinary tract infection without hematuria, site unspecified  (primary encounter diagnosis)  Metabolic encephalopathy  Hyponatremia     Att: Christiano Conroy MD  PCP: Keegan Chan MD  IP Consult Orders (From admission, onward)             Start     Ordered    07/19/22 0835  Inpatient consult to Nephrology  ONE TIME        Provider:  Ike Almendarez MD    07/19/22 0834                    BP: (!) 148/77  Temp: 98.6 °F (37 °C)  Temp src: Oral  Heart Rate: 88  Resp: 17  SpO2: 94 %  Height: 5' 2\" (157.5 cm)  Weight: 54.4 kg (120 lb)   Weight change:      No results available in last 24 hours     Creatinine (mg/dL)   Date Value   07/21/2022 0.69   07/21/2022 0.67     PTT (sec)   Date Value   07/17/2022 24     INR (no units)   Date Value   07/17/2022 1.0     WBC (K/mcL)   Date Value   07/19/2022 7.6   07/17/2022 5.9        I/O last 3 completed shifts:  In: 837.1 [P.O.:440; I.V.:397.1]  Out: 1501 [Urine:1500; Stool:1]                         IMPORTANT EVENTS THIS SHIFT:  In bed, denies any nausea or pain  Daughter at bedside, interferes with care  Up and assisted to commode, x2 person assist, gait belt used  Safety precaution observed  Lethargic, but arousable, DNR  Hardly follows commands  Medications not given due to lethargy  0600 levothyroxine given, needs anticipated IMPORTANT EVENTS COMING UP/GOALS (PLEASE INCLUDE WHITE BOARD AND DISCHARGE BOARD UPDATES):       PATIENT SPECIAL NEEDS/ACCOMMODATIONS:                                  same

## 2022-09-08 ENCOUNTER — NON-APPOINTMENT (OUTPATIENT)
Age: 57
End: 2022-09-08

## 2022-09-21 ENCOUNTER — APPOINTMENT (OUTPATIENT)
Dept: BREAST CENTER | Facility: CLINIC | Age: 57
End: 2022-09-21

## 2022-09-21 VITALS — WEIGHT: 120 LBS | HEART RATE: 73 BPM | BODY MASS INDEX: 22.66 KG/M2 | OXYGEN SATURATION: 99 % | HEIGHT: 61 IN

## 2022-09-21 PROCEDURE — 99213 OFFICE O/P EST LOW 20 MIN: CPT

## 2022-09-21 RX ORDER — BUSPIRONE HYDROCHLORIDE 5 MG/1
5 TABLET ORAL
Qty: 60 | Refills: 0 | Status: DISCONTINUED | COMMUNITY
Start: 2022-02-23 | End: 2022-09-21

## 2022-09-21 NOTE — DATA REVIEWED
[FreeTextEntry1] : 2/18/2020 MRI ([facility]): right, reconstructed breast, 0.5cm enhancing suspicious lesion in the UOQ, 4n4cm from the reconstructed nipple, correlation w/ US and US guided biopsy is recommended. Left breast, no suspicious enhancement. BI-RADS 4B.  \par \par 2/20/2020 Right US ([facility]): hypoechoic lesion at 10:00 in the reconstructed breast. US guided biopsy is recommended and was performed on the same date. BI-RADS 4B.  \par \par 2/20/2020 Right US biopsy 10:00 4.4FN ([facility]): infiltrating ductal carcinoma, well differentiated. IHC is pending.  \par \par 3/17/2020 R Chest wall excision with SLNB: ([facility] with Dr. Ivan): 0.4cm recurrent infiltrating ductal carcinoma, negative margins \par \par 3/25/21: L MG & Rene US ([facility]): heterogeneously dense, US – R – no suspicious findings, scars noted 6:00 and 10:00, scar from the drain is visible with minima changes in the skin on sonography 8:00 8FN which may correlate w/ tiny focus of enhancement on MRI. BIRADS 2.  \par \par 3/25/21: MRI ([facility]): heteorgeneously dense fibroglandular tissue on left with minimal background parenchymal enhancement, R – TRAM flap reconstruction, post-lumpx scarring seen in addition to reconstructed breast, peripherally in UOQ 10:00 2cm above scar there is a 0.5cm focus of enhancement which appears to be related to a vascular structure, 0.4cm focus of enhancement assoc w/ skin 8:00 far peripherally which seems to be location of previous drain, L – two foci of enhancement in the lateral breast at approx the level of the nipple. BIRADS 0. - Upon comparison with prior images it was found that he 5 mm enhancing nodule in the right upper outer quadrant, at 10:00, above the scar, is stable.  \par The tiny focus of enhancement associated with the skin at 8:00, far peripherally, was not present on the prior study; however it is very tiny and does not raise concern at this time. It could be related to some of the subcutaneous vessels or the scar from the drain \par \par 3/29/22 L Screening MMG/US ([facility]): Heterogeneously dense. No mammographic or ultrasonographic evidence of malignancy. No significant change. BIRADS 2 - Benign. Follow up imaging in 12 months.

## 2022-09-21 NOTE — PHYSICAL EXAM
[Supple] : supple [No Supraclavicular Adenopathy] : no supraclavicular adenopathy [No Cervical Adenopathy] : no cervical adenopathy [Examined in the supine and seated position] : examined in the supine and seated position [Asymmetrical] : asymmetrical [No dominant masses] : no dominant masses left breast [No Nipple Retraction] : no left nipple retraction [No Nipple Discharge] : no left nipple discharge [No Axillary Lymphadenopathy] : no left axillary lymphadenopathy [No Rashes] : no rashes [No Ulceration] : no ulceration [de-identified] : TM with VICENTE reconstruction with well-healed scar from 2020 excision and radiation changes

## 2023-03-14 ENCOUNTER — APPOINTMENT (OUTPATIENT)
Dept: BREAST CENTER | Facility: CLINIC | Age: 58
End: 2023-03-14
Payer: COMMERCIAL

## 2023-03-14 VITALS
DIASTOLIC BLOOD PRESSURE: 86 MMHG | WEIGHT: 121 LBS | BODY MASS INDEX: 22.84 KG/M2 | HEART RATE: 80 BPM | HEIGHT: 61 IN | SYSTOLIC BLOOD PRESSURE: 127 MMHG

## 2023-03-14 PROCEDURE — 99213 OFFICE O/P EST LOW 20 MIN: CPT

## 2023-03-18 NOTE — DATA REVIEWED
[FreeTextEntry1] : 2/18/2020 MRI ([facility]): right, reconstructed breast, 0.5cm enhancing suspicious lesion in the UOQ, 4n4cm from the reconstructed nipple, correlation w/ US and US guided biopsy is recommended. Left breast, no suspicious enhancement. BI-RADS 4B.  \par \par 2/20/2020 Right US ([facility]): hypoechoic lesion at 10:00 in the reconstructed breast. US guided biopsy is recommended and was performed on the same date. BI-RADS 4B.  \par \par 2/20/2020 Right US biopsy 10:00 4.4FN ([facility]): infiltrating ductal carcinoma, well differentiated. IHC is pending.  \par \par 3/17/2020 R Chest wall excision with SLNB: ([facility] with Dr. Ivan): 0.4cm recurrent infiltrating ductal carcinoma, negative margins \par \par 3/25/21: L MG & Rene US ([facility]): heterogeneously dense, US – R – no suspicious findings, scars noted 6:00 and 10:00, scar from the drain is visible with minima changes in the skin on sonography 8:00 8FN which may correlate w/ tiny focus of enhancement on MRI. BIRADS 2.  \par \par 3/25/21: MRI ([facility]): heteorgeneously dense fibroglandular tissue on left with minimal background parenchymal enhancement, R – TRAM flap reconstruction, post-lumpx scarring seen in addition to reconstructed breast, peripherally in UOQ 10:00 2cm above scar there is a 0.5cm focus of enhancement which appears to be related to a vascular structure, 0.4cm focus of enhancement assoc w/ skin 8:00 far peripherally which seems to be location of previous drain, L – two foci of enhancement in the lateral breast at approx the level of the nipple. BIRADS 0. - Upon comparison with prior images it was found that he 5 mm enhancing nodule in the right upper outer quadrant, at 10:00, above the scar, is stable.  \par The tiny focus of enhancement associated with the skin at 8:00, far peripherally, was not present on the prior study; however it is very tiny and does not raise concern at this time. It could be related to some of the subcutaneous vessels or the scar from the drain \par \par 3/29/22 L Screening MMG/US ([facility]): Heterogeneously dense. No mammographic or ultrasonographic evidence of malignancy. No significant change. BIRADS 2 - Benign. Follow up imaging in 12 months. \par \par

## 2023-03-18 NOTE — HISTORY OF PRESENT ILLNESS
[FreeTextEntry1] : 56 yo BRCA (-) F presents for annual follow up s/p right chest wall excision with SLNB of recurrent breast cancer on 3/17/20 for infiltrating ductal carcinoma ER 90%, MN 50%, HER-2 negative. Final surgical pathology revealed infiltrating ductal carcinoma, recurrent, spanning 4mm. S/p XRT with rad onc Dr. Ingram. Of note, recurrence and excision took place after patient's R mastectomy and VICENTE flap reconstruction. \par \par She has a previous history of multifocal invasive ductal carcinoma (0.6cm) ER/MN positive, HER-2 negative and infiltrating lobular carcinoma (0.8cm) ER positive, MN negative, HER-2 negative with DCIS s/p right mastectomy w/ SLNB and VICENTE in 2015. She is under the care of Dr. Gamez (medical oncologist) and is taking Anastrazole. Patient was diagnosed with osteoporosis for which she is getting injections. Patient was previously under the care of Dr. Martita Ivan. Patient continues to follow with Dr. Lerman. B/L MRI 9/7/22 BIRADS-2. \par \par L dxMMG/US performed today BIRADS 1. \par \par Patient complaining of right chest wall tightness for which she sees physical therapy and gets massages. Denies any palpable abnormalities, skin changes, nipple changes, or nipple discharge bilaterally.\par

## 2023-03-18 NOTE — ASSESSMENT
[FreeTextEntry1] : 58 yo BRCA (-) female presents for f/u evaluation s/p right chest well excision with SLNB for recurrent breast cancer on 3/17/20 for infiltrating ductal carcinoma ER 90%, SD 50%, HER-2 negative, s/p XRT. Of note, recurrence and excision took place after patient's R mastectomy and VICENTE flap reconstruction in 2015. Patient is without complaint, physical exam WNL. Most recent imaging reviewed,  B/L MRI in Sept 2022 BIRADS 2. L dxMMG/US performed today BIRADS 1. Provided patient reassurance regarding symptoms and recommend continuing physical therapy and massage therapy. Patient will have B/L MRI in 6 months and reexamination at this time. Patient verbalized understanding and agreement of plan.\par

## 2023-03-18 NOTE — PAST MEDICAL HISTORY
[Menarche Age ____] : age at menarche was [unfilled] [Menopause Age____] : age at menopause was [unfilled] [History of Hormone Replacement Treatment] : has a history of hormone replacement treatment [Total Preg ___] : G[unfilled] [Live Births ___] : P[unfilled]  [Abortions ___] : Abortions:[unfilled] [Age At Live Birth ___] : Age at live birth: [unfilled] [Postmenopausal] : The patient is postmenopausal [FreeTextEntry2] : miscarriage  4 [FreeTextEntry6] : yes   [FreeTextEntry8] : yes  [FreeTextEntry7] : no

## 2023-03-18 NOTE — PHYSICAL EXAM
[Supple] : supple [No Supraclavicular Adenopathy] : no supraclavicular adenopathy [No Cervical Adenopathy] : no cervical adenopathy [Examined in the supine and seated position] : examined in the supine and seated position [Asymmetrical] : asymmetrical [No dominant masses] : no dominant masses left breast [No Nipple Retraction] : no left nipple retraction [No Nipple Discharge] : no left nipple discharge [No Axillary Lymphadenopathy] : no left axillary lymphadenopathy [No Rashes] : no rashes [No Ulceration] : no ulceration [de-identified] : TM with VICENTE reconstruction with well-healed scar from 2020 excision and radiation changes

## 2023-08-18 ENCOUNTER — NON-APPOINTMENT (OUTPATIENT)
Age: 58
End: 2023-08-18

## 2023-09-01 ENCOUNTER — NON-APPOINTMENT (OUTPATIENT)
Age: 58
End: 2023-09-01

## 2023-09-05 ENCOUNTER — APPOINTMENT (OUTPATIENT)
Dept: BREAST CENTER | Facility: CLINIC | Age: 58
End: 2023-09-05
Payer: COMMERCIAL

## 2023-09-05 VITALS
BODY MASS INDEX: 22.84 KG/M2 | SYSTOLIC BLOOD PRESSURE: 113 MMHG | HEART RATE: 83 BPM | WEIGHT: 121 LBS | DIASTOLIC BLOOD PRESSURE: 81 MMHG | HEIGHT: 61 IN

## 2023-09-05 DIAGNOSIS — Z90.11 ACQUIRED ABSENCE OF RIGHT BREAST AND NIPPLE: ICD-10-CM

## 2023-09-05 PROCEDURE — 99214 OFFICE O/P EST MOD 30 MIN: CPT

## 2023-09-05 NOTE — ASSESSMENT
[FreeTextEntry1] : 59 yo female with history of multifocal right breast cancer in 2015 and recurrence in 202. History of right breast multifocal invasive ductal carcinoma, 0.6cm ER/ME positive, HER-2 negative and infiltrating lobular carcinoma, 0.8cm, ER positive, ME negative, HER-2 negative with DCIS s/p right mastectomy, SLNB with VICENTE flap reconstruction in 2015 s/p Tamoxifen from [id number], history of right chest wall recurrence in 2020, IDC, 0.4cm, ER 90%, ME 50%, HER-2 negative s/p right chest wall excision with SLNB on 3/17/20 s/p XRT, on Anastrozole.   Reviewed reports of right latissimus tightness after continued right upper extremity use, encouraged continued physical therapy, advised patient if she does not feel improvement from PT, will plan to refer to PMR services at [facility], patient was amenable. Discussed complaints of right lateral breast itching sensation with unremarkable exam today, reviewed this is likely nerve-related, advised application of alternating warm and cold packs to help desensitize nerve.   Reviewed complaints of insomnia recommend patient follow up with oncologist Dr. Gamez to discuss various sleep interventions such as supplements vs other medication options for sleep support.   Reviewed patient is due for breast MRI this month for breast caner surveillance. Discussed if MRI is benign, patient will be due for left mammogram/US in March and follow up at that time.

## 2023-09-05 NOTE — DATA REVIEWED
[FreeTextEntry1] : 2/18/2020 MRI ([facility]): right, reconstructed breast, 0.5cm enhancing suspicious lesion in the UOQ, 4n4cm from the reconstructed nipple, correlation w/ US and US guided biopsy is recommended. Left breast, no suspicious enhancement. BI-RADS 4B.    2/20/2020 Right US ([facility]): hypoechoic lesion at 10:00 in the reconstructed breast. US guided biopsy is recommended and was performed on the same date. BI-RADS 4B.    2/20/2020 Right US biopsy 10:00 4.4FN ([facility]): infiltrating ductal carcinoma, well differentiated. IHC is pending.    3/17/2020 R Chest wall excision with SLNB: ([facility] with Dr. Ivan): 0.4cm recurrent infiltrating ductal carcinoma, negative margins   3/25/21: L MG & Rene US ([facility]): heterogeneously dense, US - R - no suspicious findings, scars noted 6:00 and 10:00, scar from the drain is visible with minima changes in the skin on sonography 8:00 8FN which may correlate w/ tiny focus of enhancement on MRI. BIRADS 2.    3/25/21: MRI ([facility]): heteorgeneously dense fibroglandular tissue on left with minimal background parenchymal enhancement, R - TRAM flap reconstruction, post-lumpx scarring seen in addition to reconstructed breast, peripherally in UOQ 10:00 2cm above scar there is a 0.5cm focus of enhancement which appears to be related to a vascular structure, 0.4cm focus of enhancement assoc w/ skin 8:00 far peripherally which seems to be location of previous drain, L - two foci of enhancement in the lateral breast at approx the level of the nipple. BIRADS 0. - Upon comparison with prior images it was found that he 5 mm enhancing nodule in the right upper outer quadrant, at 10:00, above the scar, is stable.   The tiny focus of enhancement associated with the skin at 8:00, far peripherally, was not present on the prior study; however it is very tiny and does not raise concern at this time. It could be related to some of the subcutaneous vessels or the scar from the drain   3/29/22 L Screening MMG/US ([facility]): Heterogeneously dense. No mammographic or ultrasonographic evidence of malignancy. No significant change. BIRADS 2 - Benign. Follow up imaging in 12 months.   3/29/22 ([facility]) left screening mammogram/US: Heterogeneously dense. No mammographic or ultrasonographic evidence of malignancy. No significant change. BIRADS 2 - Benign. Follow up imaging in 12 months.  3/14/23 ([facility]) left dx mammogram/US: extremely dense, No mammographic or sonographic evidence of malignancy. Negative BIRADS 1

## 2023-09-05 NOTE — HISTORY OF PRESENT ILLNESS
[FreeTextEntry1] : 59 yo female presents for breast cancer surveillance with a history of right breast cancer in 2015 with recurrence in 2020. Patient has a history of right breast multifocal invasive ductal carcinoma, 0.6cm ER/OH positive, HER-2 negative and infiltrating lobular carcinoma, 0.8cm, ER positive, OH negative, HER-2 negative with DCIS s/p right mastectomy, SLNB with VICENTE flap reconstruction in 2015 by Dr. Bustos followed by taking Anastrozole managed by Dr. Gamez with a history of recurrence in 2020, right chest wall recurrence, right IDC, 0.4cm, ER 90%, OH 50%, HER-2 negative s/p right chest wall excision with SLNB on 3/17/20 with Dr. Martita Ivan s/p XRT with Dr. Ingram. Patient continues on Anastrozole and follows up routinely with Dr. Gamez. Patient reports right latissimus tightness after continued right upper extremity use, noted following painting, she is currently seeing PT. Patient also notes itching in right lateral breast, near site of incision, states her incision previously felt numb but now notes intermittent itching. Denies palpable abnormalities of the breast, no skin changes/dimpling, no nipple discharge.  Patient underwent panel genetic testing 3/7/2020 with negative results.

## 2023-09-05 NOTE — PAST MEDICAL HISTORY
[Postmenopausal] : The patient is postmenopausal [Menarche Age ____] : age at menarche was [unfilled] [Menopause Age____] : age at menopause was [unfilled] [History of Hormone Replacement Treatment] : has a history of hormone replacement treatment [Total Preg ___] : G[unfilled] [Live Births ___] : P[unfilled]  [Abortions ___] : Abortions:[unfilled] [Age At Live Birth ___] : Age at live birth: [unfilled] [FreeTextEntry2] : miscarriage  4 [FreeTextEntry6] : yes   [FreeTextEntry7] : no [FreeTextEntry8] : yes

## 2023-09-05 NOTE — PHYSICAL EXAM
[Normocephalic] : normocephalic [No Supraclavicular Adenopathy] : no supraclavicular adenopathy [Examined in the supine and seated position] : examined in the supine and seated position [No dominant masses] : no dominant masses in right breast  [No dominant masses] : no dominant masses left breast [No Nipple Retraction] : no left nipple retraction [No Nipple Discharge] : no left nipple discharge [No Axillary Lymphadenopathy] : no left axillary lymphadenopathy [No Edema] : no edema [No Swelling] : no swelling [No Rashes] : no rashes [No Ulceration] : no ulceration [de-identified] : reconstructed flap well healed, UOQ incision well healed, no evidence of recurrence

## 2023-09-05 NOTE — REVIEW OF SYSTEMS
[As Noted in HPI] : as noted in HPI [Sleep Disturbances] : sleep disturbances [Anxiety] : anxiety [Depression] : depression [Negative] : Heme/Lymph [FreeTextEntry9] : right latissimus tightness following painting, seeing PT  [de-identified] : anxiety and depression, occasional insomnia, patient currently seeing a therapist for anxiety and depression

## 2023-10-02 ENCOUNTER — NON-APPOINTMENT (OUTPATIENT)
Age: 58
End: 2023-10-02

## 2024-03-12 ENCOUNTER — NON-APPOINTMENT (OUTPATIENT)
Age: 59
End: 2024-03-12

## 2024-03-19 ENCOUNTER — APPOINTMENT (OUTPATIENT)
Dept: BREAST CENTER | Facility: CLINIC | Age: 59
End: 2024-03-19

## 2024-03-28 ENCOUNTER — APPOINTMENT (OUTPATIENT)
Dept: BREAST CENTER | Facility: CLINIC | Age: 59
End: 2024-03-28

## 2024-04-02 ENCOUNTER — APPOINTMENT (OUTPATIENT)
Dept: BREAST CENTER | Facility: CLINIC | Age: 59
End: 2024-04-02
Payer: COMMERCIAL

## 2024-04-02 VITALS
WEIGHT: 127 LBS | HEIGHT: 61 IN | HEART RATE: 75 BPM | BODY MASS INDEX: 23.98 KG/M2 | SYSTOLIC BLOOD PRESSURE: 133 MMHG | DIASTOLIC BLOOD PRESSURE: 86 MMHG

## 2024-04-02 DIAGNOSIS — R92.30 DENSE BREASTS, UNSPECIFIED: ICD-10-CM

## 2024-04-02 DIAGNOSIS — R29.898 OTHER SYMPTOMS AND SIGNS INVOLVING THE MUSCULOSKELETAL SYSTEM: ICD-10-CM

## 2024-04-02 DIAGNOSIS — Z85.3 ENCOUNTER FOR FOLLOW-UP EXAMINATION AFTER COMPLETED TREATMENT FOR MALIGNANT NEOPLASM: ICD-10-CM

## 2024-04-02 DIAGNOSIS — Z85.3 PERSONAL HISTORY OF MALIGNANT NEOPLASM OF BREAST: ICD-10-CM

## 2024-04-02 DIAGNOSIS — Z08 ENCOUNTER FOR FOLLOW-UP EXAMINATION AFTER COMPLETED TREATMENT FOR MALIGNANT NEOPLASM: ICD-10-CM

## 2024-04-02 PROCEDURE — 99213 OFFICE O/P EST LOW 20 MIN: CPT

## 2024-04-02 NOTE — REVIEW OF SYSTEMS
[As Noted in HPI] : as noted in HPI [Anxiety] : anxiety [Sleep Disturbances] : sleep disturbances [Depression] : depression [Negative] : Heme/Lymph [FreeTextEntry9] : right latissimus and upper trapezius tightness following painting, seeing PT  [de-identified] : anxiety and depression, occasional insomnia, patient currently seeing a therapist for anxiety and depression

## 2024-04-02 NOTE — PHYSICAL EXAM
[No Supraclavicular Adenopathy] : no supraclavicular adenopathy [Normocephalic] : normocephalic [Examined in the supine and seated position] : examined in the supine and seated position [No dominant masses] : no dominant masses in right breast  [No dominant masses] : no dominant masses left breast [No Nipple Retraction] : no left nipple retraction [No Nipple Discharge] : no left nipple discharge [No Axillary Lymphadenopathy] : no right axillary lymphadenopathy [No Edema] : no edema [No Rashes] : no rashes [No Swelling] : no swelling [No Ulceration] : no ulceration [de-identified] : reconstructed flap well healed, UOQ incision well healed, no evidence of recurrence

## 2024-04-02 NOTE — PAST MEDICAL HISTORY
[Menarche Age ____] : age at menarche was [unfilled] [Postmenopausal] : The patient is postmenopausal [History of Hormone Replacement Treatment] : has a history of hormone replacement treatment [Menopause Age____] : age at menopause was [unfilled] [Total Preg ___] : G[unfilled] [Live Births ___] : P[unfilled]  [Abortions ___] : Abortions:[unfilled] [Age At Live Birth ___] : Age at live birth: [unfilled] [FreeTextEntry2] : miscarriage  4 [FreeTextEntry8] : yes  [FreeTextEntry6] : yes   [FreeTextEntry7] : no

## 2024-04-02 NOTE — PLAN
[TextEntry] : Consult with Dr. Taylor Follow-up with medical oncologist as scheduled this month Breast MRI October 2024 Follow-up October 2024

## 2024-04-02 NOTE — HISTORY OF PRESENT ILLNESS
[FreeTextEntry1] : 57 yo female presents for breast cancer surveillance with a history of right breast cancer in 2015 with recurrence in 2020. Patient has a history of right breast multifocal invasive ductal carcinoma, 0.6cm ER/AL positive, HER-2 negative and infiltrating lobular carcinoma, 0.8cm, ER positive, AL negative, HER-2 negative with DCIS s/p right mastectomy, SLNB with VICENTE flap reconstruction in 2015 by Dr. Bustos followed by taking Anastrozole managed by Dr. Gamez with a history of recurrence in 2020, right chest wall recurrence, right IDC, 0.4cm, ER 90%, AL 50%, HER-2 negative s/p right chest wall excision with SLNB on 3/17/20 with Dr. Martita Ivan s/p XRT with Dr. Ingram. Patient continues on Anastrozole and follows up routinely with Dr. Gamez. Patient reports right latissimus tightness after continued right upper extremity use, noted following painting, she is currently seeing PT, but states she is still experiencing tightness in her left upper trapezius muscles. Denies palpable abnormalities of the breast, no skin changes/dimpling, no nipple discharge.  Patient underwent panel genetic testing 3/7/2020 with negative results.  Of note patient's platelets are currently low she is following up with Dr. Gamez regarding this.  Of note the patient states that she fell last month, she states that her oncologist ordered her for a brain MRI given the unprovoked fall, patient has not undergone this yet.

## 2024-04-02 NOTE — DATA REVIEWED
[FreeTextEntry1] : 2/18/2020 MRI ([facility]): right, reconstructed breast, 0.5cm enhancing suspicious lesion in the UOQ, 4n4cm from the reconstructed nipple, correlation w/ US and US guided biopsy is recommended. Left breast, no suspicious enhancement. BI-RADS 4B.    2/20/2020 Right US ([facility]): hypoechoic lesion at 10:00 in the reconstructed breast. US guided biopsy is recommended and was performed on the same date. BI-RADS 4B.    2/20/2020 Right US biopsy 10:00 4.4FN ([facility]): infiltrating ductal carcinoma, well differentiated. IHC is pending.    3/17/2020 R Chest wall excision with SLNB: ([facility] with Dr. Ivan): 0.4cm recurrent infiltrating ductal carcinoma, negative margins   3/25/21: L MG & Rene US ([facility]): heterogeneously dense, US - R - no suspicious findings, scars noted 6:00 and 10:00, scar from the drain is visible with minima changes in the skin on sonography 8:00 8FN which may correlate w/ tiny focus of enhancement on MRI. BIRADS 2.    3/25/21: MRI ([facility]): heteorgeneously dense fibroglandular tissue on left with minimal background parenchymal enhancement, R - TRAM flap reconstruction, post-lumpx scarring seen in addition to reconstructed breast, peripherally in UOQ 10:00 2cm above scar there is a 0.5cm focus of enhancement which appears to be related to a vascular structure, 0.4cm focus of enhancement assoc w/ skin 8:00 far peripherally which seems to be location of previous drain, L - two foci of enhancement in the lateral breast at approx the level of the nipple. BIRADS 0. - Upon comparison with prior images it was found that he 5 mm enhancing nodule in the right upper outer quadrant, at 10:00, above the scar, is stable.   The tiny focus of enhancement associated with the skin at 8:00, far peripherally, was not present on the prior study; however it is very tiny and does not raise concern at this time. It could be related to some of the subcutaneous vessels or the scar from the drain   3/29/22 L Screening MMG/US ([facility]): Heterogeneously dense. No mammographic or ultrasonographic evidence of malignancy. No significant change. BIRADS 2 - Benign. Follow up imaging in 12 months.   3/29/22 ([facility]) left screening mammogram/US: Heterogeneously dense. No mammographic or ultrasonographic evidence of malignancy. No significant change. BIRADS 2 - Benign. Follow up imaging in 12 months.  3/14/23 ([facility]) left dx mammogram/US: extremely dense, No mammographic or sonographic evidence of malignancy. Negative BIRADS 1  9/29/2023 ([facility]) bilateral breast MRI: No MRI evidence of malignancy, benign BI-RADS 2  4/2/2024 ([facility]) left screening mammogram and US: Heterogeneously dense, no mammographic or sonographic evidence of malignancy.  Negative BI-RADS 1

## 2024-04-02 NOTE — ASSESSMENT
[FreeTextEntry1] : 57 yo female with history of multifocal right breast cancer in 2015 and recurrence in 2020. History of right breast multifocal invasive ductal carcinoma, 0.6cm ER/DC positive, HER-2 negative and infiltrating lobular carcinoma, 0.8cm, ER positive, DC negative, HER-2 negative with DCIS s/p right mastectomy, SLNB with VICENTE flap reconstruction in 2015 s/p Tamoxifen from [id number], history of right chest wall recurrence in 2020, IDC, 0.4cm, ER 90%, DC 50%, HER-2 negative s/p right chest wall excision with SLNB on 3/17/20 s/p XRT, on Anastrozole.   Reviewed reports of right latissimus and left upper trapezius tightness after continued upper extremity use, discussed recommendation to proceed with consult with Dr. Taylor (PMR) for further evaluation.  Reviewed complaints of insomnia recommend patient follow up with oncologist Dr. Gamez to discuss various sleep interventions such as supplements vs other medication options for sleep support.   Reviewed results of patient's left screening mammogram and US completed today, results were negative BI-RADS 1.  Discussed patient will be due for breast MRI in October for breast cancer surveillance and follow-up at that time.

## 2024-10-03 ENCOUNTER — APPOINTMENT (OUTPATIENT)
Dept: BREAST CENTER | Facility: CLINIC | Age: 59
End: 2024-10-03
Payer: COMMERCIAL

## 2024-10-03 VITALS
DIASTOLIC BLOOD PRESSURE: 86 MMHG | HEIGHT: 61 IN | HEART RATE: 71 BPM | BODY MASS INDEX: 23.6 KG/M2 | WEIGHT: 125 LBS | SYSTOLIC BLOOD PRESSURE: 144 MMHG

## 2024-10-03 DIAGNOSIS — M79.601 PAIN IN RIGHT ARM: ICD-10-CM

## 2024-10-03 DIAGNOSIS — Z85.3 PERSONAL HISTORY OF MALIGNANT NEOPLASM OF BREAST: ICD-10-CM

## 2024-10-03 DIAGNOSIS — Z42.1 ENCOUNTER FOR BREAST RECONSTRUCTION FOLLOWING MASTECTOMY: ICD-10-CM

## 2024-10-03 PROCEDURE — 99213 OFFICE O/P EST LOW 20 MIN: CPT

## 2024-10-03 NOTE — ASSESSMENT
[FreeTextEntry1] : 59-year-old female, history of right breast cancer in 2015 with recurrence in 2020, encounter for breast cancer surveillance follow-up  #History of right breast cancer, #encounter for breast cancer surveillance follow-up Patient with history of multifocal right breast cancer in 2015 and recurrence in 2020. History of right breast multifocal invasive ductal carcinoma, 0.6cm ER/CO positive, HER-2 negative and infiltrating lobular carcinoma, 0.8cm, ER positive, CO negative, HER-2 negative with DCIS s/p right mastectomy, SLNB with VICENTE flap reconstruction in 2015 s/p Tamoxifen from [id number], history of right chest wall recurrence in 2020, IDC, 0.4cm, ER 90%, CO 50%, HER-2 negative s/p right chest wall excision with SLNB on 3/17/20 s/p XRT, on Anastrozole.  The patient continues on anastrozole and routinely follows up with her medical oncologist, she states she plans to complete a 10-year course of anastrozole.  Reviewed patient's results of breast MRI completed today which returned benign.  Discussed the recommendation to proceed with a left screening mammogram & US in April 2025 and follow-up at that time.  #Right upper extremity weakness/pain Reviewed reports of right latissimus and left upper trapezius tightness after continued upper extremity use, discussed recommendation to proceed with consult with Dr. Tyalor (PMR) for further evaluation.  Reviewed the nodules noted to the patient's bilateral thumbs, discussed with the patient that this may be a sign of osteoarthritis, recommendation was made to follow-up with her PCP regarding these findings.

## 2024-10-03 NOTE — PHYSICAL EXAM
[Normocephalic] : normocephalic [No Supraclavicular Adenopathy] : no supraclavicular adenopathy [Examined in the supine and seated position] : examined in the supine and seated position [No dominant masses] : no dominant masses in right breast  [No dominant masses] : no dominant masses left breast [No Nipple Retraction] : no left nipple retraction [No Nipple Discharge] : no left nipple discharge [No Axillary Lymphadenopathy] : no left axillary lymphadenopathy [No Edema] : no edema [No Swelling] : no swelling [No Rashes] : no rashes [No Ulceration] : no ulceration [de-identified] : reconstructed flap well healed, UOQ incision well healed, no evidence of recurrence

## 2024-10-03 NOTE — DATA REVIEWED
[FreeTextEntry1] : 2/18/2020 MRI ([facility]): right, reconstructed breast, 0.5cm enhancing suspicious lesion in the UOQ, 4n4cm from the reconstructed nipple, correlation w/ US and US guided biopsy is recommended. Left breast, no suspicious enhancement. BI-RADS 4B.    2/20/2020 Right US ([facility]): hypoechoic lesion at 10:00 in the reconstructed breast. US guided biopsy is recommended and was performed on the same date. BI-RADS 4B.    2/20/2020 Right US biopsy 10:00 4.4FN ([facility]): infiltrating ductal carcinoma, well differentiated. IHC is pending.    3/17/2020 R Chest wall excision with SLNB: ([facility] with Dr. Ivan): 0.4cm recurrent infiltrating ductal carcinoma, negative margins   3/25/21: L MG & Rene US ([facility]): heterogeneously dense, US - R - no suspicious findings, scars noted 6:00 and 10:00, scar from the drain is visible with minima changes in the skin on sonography 8:00 8FN which may correlate w/ tiny focus of enhancement on MRI. BIRADS 2.    3/25/21: MRI ([facility]): heteorgeneously dense fibroglandular tissue on left with minimal background parenchymal enhancement, R - TRAM flap reconstruction, post-lumpx scarring seen in addition to reconstructed breast, peripherally in UOQ 10:00 2cm above scar there is a 0.5cm focus of enhancement which appears to be related to a vascular structure, 0.4cm focus of enhancement assoc w/ skin 8:00 far peripherally which seems to be location of previous drain, L - two foci of enhancement in the lateral breast at approx the level of the nipple. BIRADS 0. - Upon comparison with prior images it was found that he 5 mm enhancing nodule in the right upper outer quadrant, at 10:00, above the scar, is stable.   The tiny focus of enhancement associated with the skin at 8:00, far peripherally, was not present on the prior study; however it is very tiny and does not raise concern at this time. It could be related to some of the subcutaneous vessels or the scar from the drain   3/29/22 L Screening MMG/US ([facility]): Heterogeneously dense. No mammographic or ultrasonographic evidence of malignancy. No significant change. BIRADS 2 - Benign. Follow up imaging in 12 months.   3/29/22 ([facility]) left screening mammogram/US: Heterogeneously dense. No mammographic or ultrasonographic evidence of malignancy. No significant change. BIRADS 2 - Benign. Follow up imaging in 12 months.  3/14/23 (R) left dx mammogram/US: extremely dense, No mammographic or sonographic evidence of malignancy. Negative BIRADS 1  9/29/2023 ([facility]) bilateral breast MRI: No MRI evidence of malignancy, benign BI-RADS 2  4/2/2024 ([facility]) left screening mammogram and US: Heterogeneously dense, no mammographic or sonographic evidence of malignancy.  Negative BI-RADS 1  10/3/2024 ([facility]) bilateral breast MRI: No MRI evidence of malignancy, benign BI-RADS 2

## 2024-10-03 NOTE — DATA REVIEWED
[FreeTextEntry1] : 2/18/2020 MRI ([facility]): right, reconstructed breast, 0.5cm enhancing suspicious lesion in the UOQ, 4n4cm from the reconstructed nipple, correlation w/ US and US guided biopsy is recommended. Left breast, no suspicious enhancement. BI-RADS 4B.    2/20/2020 Right US ([facility]): hypoechoic lesion at 10:00 in the reconstructed breast. US guided biopsy is recommended and was performed on the same date. BI-RADS 4B.    2/20/2020 Right US biopsy 10:00 4.4FN ([facility]): infiltrating ductal carcinoma, well differentiated. IHC is pending.    3/17/2020 R Chest wall excision with SLNB: ([facility] with Dr. Ivan): 0.4cm recurrent infiltrating ductal carcinoma, negative margins   3/25/21: L MG & Rene US ([facility]): heterogeneously dense, US - R - no suspicious findings, scars noted 6:00 and 10:00, scar from the drain is visible with minima changes in the skin on sonography 8:00 8FN which may correlate w/ tiny focus of enhancement on MRI. BIRADS 2.    3/25/21: MRI ([facility]): heteorgeneously dense fibroglandular tissue on left with minimal background parenchymal enhancement, R - TRAM flap reconstruction, post-lumpx scarring seen in addition to reconstructed breast, peripherally in UOQ 10:00 2cm above scar there is a 0.5cm focus of enhancement which appears to be related to a vascular structure, 0.4cm focus of enhancement assoc w/ skin 8:00 far peripherally which seems to be location of previous drain, L - two foci of enhancement in the lateral breast at approx the level of the nipple. BIRADS 0. - Upon comparison with prior images it was found that he 5 mm enhancing nodule in the right upper outer quadrant, at 10:00, above the scar, is stable.   The tiny focus of enhancement associated with the skin at 8:00, far peripherally, was not present on the prior study; however it is very tiny and does not raise concern at this time. It could be related to some of the subcutaneous vessels or the scar from the drain   3/29/22 L Screening MMG/US ([facility]): Heterogeneously dense. No mammographic or ultrasonographic evidence of malignancy. No significant change. BIRADS 2 - Benign. Follow up imaging in 12 months.   3/29/22 (R) left screening mammogram/US: Heterogeneously dense. No mammographic or ultrasonographic evidence of malignancy. No significant change. BIRADS 2 - Benign. Follow up imaging in 12 months.  3/14/23 (R) left dx mammogram/US: extremely dense, No mammographic or sonographic evidence of malignancy. Negative BIRADS 1  9/29/2023 ([facility]) bilateral breast MRI: No MRI evidence of malignancy, benign BI-RADS 2  4/2/2024 ([facility]) left screening mammogram and US: Heterogeneously dense, no mammographic or sonographic evidence of malignancy.  Negative BI-RADS 1  10/3/2024 (R) bilateral breast MRI: No MRI evidence of malignancy, benign BI-RADS 2 English

## 2024-10-03 NOTE — REVIEW OF SYSTEMS
[As Noted in HPI] : as noted in HPI [Sleep Disturbances] : sleep disturbances [Anxiety] : anxiety [Depression] : depression [Negative] : Heme/Lymph [FreeTextEntry9] : right latissimus and upper trapezius tightness following painting, seeing PT  [de-identified] : anxiety and depression, occasional insomnia, patient currently seeing a therapist for anxiety and depression

## 2024-10-03 NOTE — PLAN
[TextEntry] : Left screening mammogram & US April 2025 Follow-up April 2025 Consult with Dr. Dianna Taylor (PMR) Follow-up with PCP regarding nodules on bilateral thumbs

## 2024-10-03 NOTE — ASSESSMENT
[FreeTextEntry1] : 59-year-old female, history of right breast cancer in 2015 with recurrence in 2020, encounter for breast cancer surveillance follow-up  #History of right breast cancer, #encounter for breast cancer surveillance follow-up Patient with history of multifocal right breast cancer in 2015 and recurrence in 2020. History of right breast multifocal invasive ductal carcinoma, 0.6cm ER/DE positive, HER-2 negative and infiltrating lobular carcinoma, 0.8cm, ER positive, DE negative, HER-2 negative with DCIS s/p right mastectomy, SLNB with VICENTE flap reconstruction in 2015 s/p Tamoxifen from [id number], history of right chest wall recurrence in 2020, IDC, 0.4cm, ER 90%, DE 50%, HER-2 negative s/p right chest wall excision with SLNB on 3/17/20 s/p XRT, on Anastrozole.  The patient continues on anastrozole and routinely follows up with her medical oncologist, she states she plans to complete a 10-year course of anastrozole.  Reviewed patient's results of breast MRI completed today which returned benign.  Discussed the recommendation to proceed with a left screening mammogram & US in April 2025 and follow-up at that time.  #Right upper extremity weakness/pain Reviewed reports of right latissimus and left upper trapezius tightness after continued upper extremity use, discussed recommendation to proceed with consult with Dr. Taylor (PMR) for further evaluation.  Reviewed the nodules noted to the patient's bilateral thumbs, discussed with the patient that this may be a sign of osteoarthritis, recommendation was made to follow-up with her PCP regarding these findings.

## 2024-10-03 NOTE — HISTORY OF PRESENT ILLNESS
[FreeTextEntry1] : 57 yo female presents for breast cancer surveillance with a history of right breast cancer in 2015 with recurrence in 2020. Patient has a history of right breast multifocal invasive ductal carcinoma, 0.6cm ER/MT positive, HER-2 negative and infiltrating lobular carcinoma, 0.8cm, ER positive, MT negative, HER-2 negative with DCIS s/p right mastectomy, SLNB with VICENTE flap reconstruction in 2015 by Dr. Bustos followed by taking Anastrozole managed by Dr. Gamez with a history of recurrence in 2020, right chest wall recurrence, right IDC, 0.4cm, ER 90%, MT 50%, HER-2 negative s/p right chest wall excision with SLNB on 3/17/20 with Dr. Martita Ivan s/p XRT with Dr. Ingram. Patient continues on Anastrozole and follows up routinely with Dr. Gamez. Patient reports right latissimus tightness after continued right upper extremity use, noted following painting, she is currently seeing PT, but states she is still experiencing tightness in her left upper trapezius muscles.  She was previously recommended to follow-up with Dr. Dianna Taylor for further evaluation of this and she is amenable to do so at this time.  Denies palpable abnormalities of the breast, no skin changes/dimpling, no nipple discharge.  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  She notes nodules on her bilateral thumbs which have persisted and are inflamed.  Patient underwent panel genetic testing 3/7/2020 with negative results.  The patient previously had low platelet count and was being followed by her oncologist Dr. Gamez regarding this, she states that her results have now normalized.   Of note the patient states that she fell last month, she states that her oncologist ordered her for a brain MRI given the unprovoked fall, patient has not undergone this yet.

## 2024-10-03 NOTE — PHYSICAL EXAM
Problem: Patient Care Overview  Goal: Plan of Care Review  POC discussed w pt, questions and concerns addressed. Pt stable, NAD noted. Fever 100.4 in afternoon, tylenol admin and cultures drawn from PAC. PO intake poor, medical team aware. PCA settings adjusted in evening, r/t pt pain 7-9/10 throughout shift to oral lesions and R neck swelling. R neck mass increasing in size and discomfort throughout shift, noted tender and sensitive to movement. Pt to CT in evening, accompanied by RN. PAC drsg cdi. Pt complaints of itching mid-day, explained to RN that issues with vanc in past, hx of requiring premedication with benadryl. Team notified, prn benadryl admin x2 this shift, itching and redness controlled. Intermittent visitors to bedside. No BM this shift, no solid food intake. Pt sleeping most of shift. Pt ambulated to CT and back. Safety maintained, will cont to monitor.        [Normocephalic] : normocephalic [No Supraclavicular Adenopathy] : no supraclavicular adenopathy [Examined in the supine and seated position] : examined in the supine and seated position [No dominant masses] : no dominant masses in right breast  [No dominant masses] : no dominant masses left breast [No Nipple Retraction] : no left nipple retraction [No Nipple Discharge] : no left nipple discharge [No Axillary Lymphadenopathy] : no left axillary lymphadenopathy [No Edema] : no edema [No Swelling] : no swelling [No Rashes] : no rashes [No Ulceration] : no ulceration [de-identified] : reconstructed flap well healed, UOQ incision well healed, no evidence of recurrence

## 2024-10-03 NOTE — HISTORY OF PRESENT ILLNESS
[FreeTextEntry1] : 57 yo female presents for breast cancer surveillance with a history of right breast cancer in 2015 with recurrence in 2020. Patient has a history of right breast multifocal invasive ductal carcinoma, 0.6cm ER/NM positive, HER-2 negative and infiltrating lobular carcinoma, 0.8cm, ER positive, NM negative, HER-2 negative with DCIS s/p right mastectomy, SLNB with VICENTE flap reconstruction in 2015 by Dr. Bustos followed by taking Anastrozole managed by Dr. Gamez with a history of recurrence in 2020, right chest wall recurrence, right IDC, 0.4cm, ER 90%, NM 50%, HER-2 negative s/p right chest wall excision with SLNB on 3/17/20 with Dr. Martita Ivan s/p XRT with Dr. Ingram. Patient continues on Anastrozole and follows up routinely with Dr. Gamez. Patient reports right latissimus tightness after continued right upper extremity use, noted following painting, she is currently seeing PT, but states she is still experiencing tightness in her left upper trapezius muscles.  She was previously recommended to follow-up with Dr. Dianna Taylor for further evaluation of this and she is amenable to do so at this time.  Denies palpable abnormalities of the breast, no skin changes/dimpling, no nipple discharge.  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  She notes nodules on her bilateral thumbs which have persisted and are inflamed.  Patient underwent panel genetic testing 3/7/2020 with negative results.  The patient previously had low platelet count and was being followed by her oncologist Dr. Gamez regarding this, she states that her results have now normalized.   Of note the patient states that she fell last month, she states that her oncologist ordered her for a brain MRI given the unprovoked fall, patient has not undergone this yet.

## 2024-10-03 NOTE — REVIEW OF SYSTEMS
[As Noted in HPI] : as noted in HPI [Sleep Disturbances] : sleep disturbances [Anxiety] : anxiety [Depression] : depression [Negative] : Heme/Lymph [FreeTextEntry9] : right latissimus and upper trapezius tightness following painting, seeing PT  [de-identified] : anxiety and depression, occasional insomnia, patient currently seeing a therapist for anxiety and depression

## 2024-10-07 ENCOUNTER — NON-APPOINTMENT (OUTPATIENT)
Age: 59
End: 2024-10-07

## 2025-04-01 ENCOUNTER — NON-APPOINTMENT (OUTPATIENT)
Age: 60
End: 2025-04-01

## 2025-04-03 ENCOUNTER — APPOINTMENT (OUTPATIENT)
Dept: BREAST CENTER | Facility: CLINIC | Age: 60
End: 2025-04-03

## 2025-05-06 ENCOUNTER — NON-APPOINTMENT (OUTPATIENT)
Age: 60
End: 2025-05-06

## 2025-05-22 ENCOUNTER — APPOINTMENT (OUTPATIENT)
Dept: BREAST CENTER | Facility: CLINIC | Age: 60
End: 2025-05-22
Payer: COMMERCIAL

## 2025-05-22 ENCOUNTER — NON-APPOINTMENT (OUTPATIENT)
Age: 60
End: 2025-05-22

## 2025-05-22 VITALS
HEART RATE: 97 BPM | HEIGHT: 61 IN | BODY MASS INDEX: 23.79 KG/M2 | DIASTOLIC BLOOD PRESSURE: 83 MMHG | WEIGHT: 126 LBS | SYSTOLIC BLOOD PRESSURE: 123 MMHG

## 2025-05-22 DIAGNOSIS — Z08 ENCOUNTER FOR FOLLOW-UP EXAMINATION AFTER COMPLETED TREATMENT FOR MALIGNANT NEOPLASM: ICD-10-CM

## 2025-05-22 DIAGNOSIS — R92.8 OTHER ABNORMAL AND INCONCLUSIVE FINDINGS ON DIAGNOSTIC IMAGING OF BREAST: ICD-10-CM

## 2025-05-22 DIAGNOSIS — C50.911 MALIGNANT NEOPLASM OF UNSPECIFIED SITE OF RIGHT FEMALE BREAST: ICD-10-CM

## 2025-05-22 DIAGNOSIS — C79.89 MALIGNANT NEOPLASM OF UNSPECIFIED SITE OF RIGHT FEMALE BREAST: ICD-10-CM

## 2025-05-22 DIAGNOSIS — Z85.3 ENCOUNTER FOR FOLLOW-UP EXAMINATION AFTER COMPLETED TREATMENT FOR MALIGNANT NEOPLASM: ICD-10-CM

## 2025-05-22 DIAGNOSIS — Z85.3 PERSONAL HISTORY OF MALIGNANT NEOPLASM OF BREAST: ICD-10-CM

## 2025-05-22 DIAGNOSIS — Z87.898 PERSONAL HISTORY OF OTHER SPECIFIED CONDITIONS: ICD-10-CM

## 2025-05-22 DIAGNOSIS — R92.30 DENSE BREASTS, UNSPECIFIED: ICD-10-CM

## 2025-05-22 PROCEDURE — 99213 OFFICE O/P EST LOW 20 MIN: CPT

## 2025-05-22 RX ORDER — MULTIVIT-MIN/IRON/FOLIC ACID/K 18-600-40
CAPSULE ORAL
Refills: 0 | Status: ACTIVE | COMMUNITY

## 2025-05-22 RX ORDER — PSYLLIUM HUSK 0.4 G
CAPSULE ORAL
Refills: 0 | Status: ACTIVE | COMMUNITY

## 2025-05-22 RX ORDER — CRANBERRY FRUIT EXTRACT 200 MG
CAPSULE ORAL
Refills: 0 | Status: ACTIVE | COMMUNITY

## 2025-08-28 ENCOUNTER — NON-APPOINTMENT (OUTPATIENT)
Age: 60
End: 2025-08-28